# Patient Record
Sex: FEMALE | Race: WHITE | Employment: OTHER | URBAN - METROPOLITAN AREA
[De-identification: names, ages, dates, MRNs, and addresses within clinical notes are randomized per-mention and may not be internally consistent; named-entity substitution may affect disease eponyms.]

---

## 2017-06-01 ENCOUNTER — AMBULATORY - HEALTHEAST (OUTPATIENT)
Dept: CARDIOLOGY | Facility: CLINIC | Age: 82
End: 2017-06-01

## 2017-06-06 ENCOUNTER — OFFICE VISIT - HEALTHEAST (OUTPATIENT)
Dept: CARDIOLOGY | Facility: CLINIC | Age: 82
End: 2017-06-06

## 2017-06-06 ENCOUNTER — AMBULATORY - HEALTHEAST (OUTPATIENT)
Dept: CARDIOLOGY | Facility: CLINIC | Age: 82
End: 2017-06-06

## 2017-06-06 DIAGNOSIS — R06.09 DOE (DYSPNEA ON EXERTION): ICD-10-CM

## 2017-06-06 DIAGNOSIS — Z95.0 CARDIAC PACEMAKER IN SITU: ICD-10-CM

## 2017-06-06 LAB — HCC DEVICE COMMENTS: NORMAL

## 2017-06-06 RX ORDER — MAGNESIUM OXIDE 400 MG/1
400 TABLET ORAL DAILY
Status: SHIPPED | COMMUNITY
Start: 2017-06-06

## 2017-06-06 RX ORDER — LEVOTHYROXINE SODIUM 150 UG/1
150 TABLET ORAL DAILY
Status: SHIPPED | COMMUNITY
Start: 2017-06-06

## 2017-06-06 ASSESSMENT — MIFFLIN-ST. JEOR: SCORE: 1110.91

## 2017-06-24 ENCOUNTER — COMMUNICATION - HEALTHEAST (OUTPATIENT)
Dept: CARDIOLOGY | Facility: CLINIC | Age: 82
End: 2017-06-24

## 2017-06-24 DIAGNOSIS — R06.09 DOE (DYSPNEA ON EXERTION): ICD-10-CM

## 2017-07-19 ENCOUNTER — COMMUNICATION - HEALTHEAST (OUTPATIENT)
Dept: CARDIOLOGY | Facility: CLINIC | Age: 82
End: 2017-07-19

## 2017-07-19 DIAGNOSIS — R06.09 DOE (DYSPNEA ON EXERTION): ICD-10-CM

## 2017-08-15 ENCOUNTER — AMBULATORY - HEALTHEAST (OUTPATIENT)
Dept: CARDIOLOGY | Facility: CLINIC | Age: 82
End: 2017-08-15

## 2017-08-15 DIAGNOSIS — I35.9 AORTIC VALVE DISORDER: ICD-10-CM

## 2017-08-15 DIAGNOSIS — Z95.2 S/P TAVR (TRANSCATHETER AORTIC VALVE REPLACEMENT): ICD-10-CM

## 2017-09-14 ENCOUNTER — AMBULATORY - HEALTHEAST (OUTPATIENT)
Dept: CARDIOLOGY | Facility: CLINIC | Age: 82
End: 2017-09-14

## 2017-09-14 DIAGNOSIS — Z95.0 CARDIAC PACEMAKER IN SITU: ICD-10-CM

## 2017-09-14 LAB — HCC DEVICE COMMENTS: NORMAL

## 2017-11-07 ENCOUNTER — HOSPITAL ENCOUNTER (OUTPATIENT)
Dept: CARDIOLOGY | Facility: CLINIC | Age: 82
Discharge: HOME | End: 2017-11-07
Attending: INTERNAL MEDICINE

## 2017-11-07 DIAGNOSIS — I35.9 AORTIC VALVE DISORDER: ICD-10-CM

## 2017-11-07 DIAGNOSIS — Z95.2 S/P TAVR (TRANSCATHETER AORTIC VALVE REPLACEMENT): ICD-10-CM

## 2017-11-07 LAB
AORTIC ROOT: 2.5 CM
AORTIC ROOT: 2.5 CM
AORTIC VALVE MEAN VELOCITY: 113 CM/S
AORTIC VALVE MEAN VELOCITY: 113 CM/S
AV MEAN GRADIENT: 6 MMHG
AV MEAN GRADIENT: 6 MMHG
BSA FOR ECHO PROCEDURE: 1.78 M2
CV BLOOD PRESSURE: NORMAL MMHG
CV ECHO HEIGHT: 64.8 IN
CV ECHO WEIGHT: 153 LBS
DOP CALC AO PEAK VEL: 181 CM/S
DOP CALC AO PEAK VEL: 181 CM/S
DOP CALC AO VTI: 40.8 CM
DOP CALC AO VTI: 40.8 CM
DOP CALC LVOT AREA: 2.27 CM2
DOP CALC LVOT DIAMETER: 1.7 CM
DOP CALC LVOT PEAK VEL: 96.3 CM/S
DOP CALC LVOT STROKE VOLUME: 45.8 CM3
DOP CALC MV VTI: 41.8 CM
DOP CALC MV VTI: 42.2 CM
DOP CALC MV VTI: 42.8 CM
DOP CALC MV VTI: 44.5 CM
DOP CALCLVOT PEAK VEL VTI: 20.2 CM
EJECTION FRACTION: 64 % (ref 55–75)
FRACTIONAL SHORTENING: 53.6 % (ref 28–44)
INTERVENTRICULAR SEPTUM IN END DIASTOLE: 1.32 CM (ref 0.6–0.9)
IVS/PW RATIO: 1.1
LA AREA 1: 31.6 CM2
LA AREA 2: 30 CM2
LEFT ATRIUM LENGTH: 6.39 CM
LEFT ATRIUM LENGTH: 6.8 CM
LEFT ATRIUM SIZE: 5.3 CM
LEFT ATRIUM SIZE: 5.3 CM
LEFT VENTRICLE CARDIAC INDEX: 1.6 L/MIN/M2
LEFT VENTRICLE CARDIAC OUTPUT: 2.9 L/MIN
LEFT VENTRICLE DIASTOLIC VOLUME INDEX: 25.3 CM3/M2 (ref 34–74)
LEFT VENTRICLE DIASTOLIC VOLUME: 45 CM3 (ref 46–106)
LEFT VENTRICLE HEART RATE: 64 BPM
LEFT VENTRICLE MASS INDEX: 131.1 G/M2
LEFT VENTRICLE SYSTOLIC VOLUME INDEX: 9 CM3/M2 (ref 11–31)
LEFT VENTRICLE SYSTOLIC VOLUME: 16 CM3 (ref 14–42)
LEFT VENTRICULAR INTERNAL DIMENSION IN DIASTOLE: 4.78 CM (ref 3.8–5.2)
LEFT VENTRICULAR INTERNAL DIMENSION IN SYSTOLE: 2.22 CM (ref 2.2–3.5)
LEFT VENTRICULAR MASS: 233.4 G
LEFT VENTRICULAR OUTFLOW TRACT MEAN GRADIENT: 2 MMHG
LEFT VENTRICULAR OUTFLOW TRACT MEAN VELOCITY: 61.9 CM/S
LEFT VENTRICULAR OUTFLOW TRACT PEAK GRADIENT: 4 MMHG
LEFT VENTRICULAR POSTERIOR WALL IN END DIASTOLE: 1.2 CM (ref 0.6–0.9)
LV STROKE VOLUME INDEX: 25.7 ML/M2
MITRAL VALVE DECELERATION SLOPE: 5910 MM/S2
MITRAL VALVE DECELERATION SLOPE: 5910 MM/S2
MITRAL VALVE MEAN INFLOW VELOCITY: 73.6 CM/S
MITRAL VALVE MEAN INFLOW VELOCITY: 73.8 CM/S
MITRAL VALVE MEAN INFLOW VELOCITY: 74.3 CM/S
MITRAL VALVE MEAN INFLOW VELOCITY: 75.4 CM/S
MITRAL VALVE PEAK VELOCITY: 150 CM/S
MITRAL VALVE PEAK VELOCITY: 157 CM/S
MITRAL VALVE PEAK VELOCITY: 157 CM/S
MITRAL VALVE PEAK VELOCITY: 164 CM/S
MITRAL VALVE PRESSURE HALF-TIME: 78 MS
MV DECELERATION TIME: 289 MS
MV DECELERATION TIME: 289 MS
MV E'TISSUE VEL-LAT: 9.26 CM/S
MV E'TISSUE VEL-LAT: 9.26 CM/S
MV E'TISSUE VEL-MED: 5.95 CM/S
MV E'TISSUE VEL-MED: 5.95 CM/S
MV MEAN GRADIENT: 3 MMHG
MV VALVE AREA PRESSURE 1/2 METHOD: 2.8 CM2
NUC REST DIASTOLIC VOLUME INDEX: 2448 LBS
NUC REST SYSTOLIC VOLUME INDEX: 64.75 IN
PR MAX PG: 4 MMHG
PR PEAK VELOCITY: 102 CM/S
PR PEAK VELOCITY: 102 CM/S
TRICUSPID VALVE ANULAR PLANE SYSTOLIC EXCURSION: 1.7 CM
TRICUSPID VALVE ANULAR PLANE SYSTOLIC EXCURSION: 1.7 CM
TRICUSPID VALVE PEAK REGURGITANT VELOCITY: 221 CM/S
TRICUSPID VALVE PEAK REGURGITANT VELOCITY: 248 CM/S
TRICUSPID VALVE PEAK REGURGITANT VELOCITY: 250 CM/S
TRICUSPID VALVE PEAK REGURGITANT VELOCITY: 262 CM/S
TRICUSPID VALVE PEAK REGURGITANT VELOCITY: 270 CM/S

## 2017-11-07 ASSESSMENT — MIFFLIN-ST. JEOR: SCORE: 1110.91

## 2017-11-10 ENCOUNTER — OFFICE VISIT - HEALTHEAST (OUTPATIENT)
Dept: CARDIOLOGY | Facility: CLINIC | Age: 82
End: 2017-11-10

## 2017-11-10 DIAGNOSIS — I34.0 NON-RHEUMATIC MITRAL REGURGITATION: ICD-10-CM

## 2017-11-10 DIAGNOSIS — I25.10 CORONARY ARTERY DISEASE INVOLVING NATIVE CORONARY ARTERY OF NATIVE HEART WITHOUT ANGINA PECTORIS: ICD-10-CM

## 2017-11-10 DIAGNOSIS — I48.21 PERMANENT ATRIAL FIBRILLATION (H): ICD-10-CM

## 2017-11-10 DIAGNOSIS — R06.09 DOE (DYSPNEA ON EXERTION): ICD-10-CM

## 2017-11-10 DIAGNOSIS — I50.32 CHRONIC DIASTOLIC HEART FAILURE (H): ICD-10-CM

## 2017-11-10 DIAGNOSIS — Z95.2 S/P TAVR (TRANSCATHETER AORTIC VALVE REPLACEMENT): ICD-10-CM

## 2017-11-10 ASSESSMENT — MIFFLIN-ST. JEOR: SCORE: 1103.54

## 2017-12-11 ENCOUNTER — COMMUNICATION - HEALTHEAST (OUTPATIENT)
Dept: ADMINISTRATIVE | Facility: CLINIC | Age: 82
End: 2017-12-11

## 2017-12-14 ENCOUNTER — AMBULATORY - HEALTHEAST (OUTPATIENT)
Dept: CARDIOLOGY | Facility: CLINIC | Age: 82
End: 2017-12-14

## 2017-12-14 DIAGNOSIS — Z95.0 CARDIAC PACEMAKER IN SITU: ICD-10-CM

## 2017-12-14 LAB — HCC DEVICE COMMENTS: NORMAL

## 2017-12-14 ASSESSMENT — MIFFLIN-ST. JEOR: SCORE: 1126.22

## 2018-03-19 ENCOUNTER — AMBULATORY - HEALTHEAST (OUTPATIENT)
Dept: CARDIOLOGY | Facility: CLINIC | Age: 83
End: 2018-03-19

## 2018-03-19 DIAGNOSIS — Z95.0 CARDIAC PACEMAKER IN SITU: ICD-10-CM

## 2018-03-20 LAB — HCC DEVICE COMMENTS: NORMAL

## 2018-04-17 ENCOUNTER — RECORDS - HEALTHEAST (OUTPATIENT)
Dept: LAB | Facility: CLINIC | Age: 83
End: 2018-04-17

## 2018-04-17 LAB
ALBUMIN SERPL-MCNC: 3.7 G/DL (ref 3.5–5)
ALP SERPL-CCNC: 75 U/L (ref 45–120)
ALT SERPL W P-5'-P-CCNC: 16 U/L (ref 0–45)
ANION GAP SERPL CALCULATED.3IONS-SCNC: 7 MMOL/L (ref 5–18)
AST SERPL W P-5'-P-CCNC: 22 U/L (ref 0–40)
BILIRUB SERPL-MCNC: 2.3 MG/DL (ref 0–1)
BUN SERPL-MCNC: 23 MG/DL (ref 8–28)
CALCIUM SERPL-MCNC: 9.5 MG/DL (ref 8.5–10.5)
CHLORIDE BLD-SCNC: 102 MMOL/L (ref 98–107)
CHOLEST SERPL-MCNC: 130 MG/DL
CO2 SERPL-SCNC: 34 MMOL/L (ref 22–31)
CREAT SERPL-MCNC: 1.38 MG/DL (ref 0.6–1.1)
FASTING STATUS PATIENT QL REPORTED: NO
GFR SERPL CREATININE-BSD FRML MDRD: 36 ML/MIN/1.73M2
GLUCOSE BLD-MCNC: 108 MG/DL (ref 70–125)
HDLC SERPL-MCNC: 40 MG/DL
LDLC SERPL CALC-MCNC: 55 MG/DL
MAGNESIUM SERPL-MCNC: 2.4 MG/DL (ref 1.8–2.6)
POTASSIUM BLD-SCNC: 4.1 MMOL/L (ref 3.5–5)
PROT SERPL-MCNC: 7.5 G/DL (ref 6–8)
SODIUM SERPL-SCNC: 143 MMOL/L (ref 136–145)
TRIGL SERPL-MCNC: 175 MG/DL
TSH SERPL DL<=0.005 MIU/L-ACNC: 0.78 UIU/ML (ref 0.3–5)

## 2018-06-25 ENCOUNTER — AMBULATORY - HEALTHEAST (OUTPATIENT)
Dept: CARDIOLOGY | Facility: CLINIC | Age: 83
End: 2018-06-25

## 2018-06-25 DIAGNOSIS — Z95.0 CARDIAC PACEMAKER IN SITU: ICD-10-CM

## 2018-06-25 LAB
HCC DEVICE COMMENTS: NORMAL
HCC DEVICE IMPLANTING PROVIDER: NORMAL
HCC DEVICE MANUFACTURE: NORMAL
HCC DEVICE MODEL: NORMAL
HCC DEVICE SERIAL NUMBER: NORMAL
HCC DEVICE TYPE: NORMAL

## 2018-10-01 ENCOUNTER — AMBULATORY - HEALTHEAST (OUTPATIENT)
Dept: CARDIOLOGY | Facility: CLINIC | Age: 83
End: 2018-10-01

## 2018-10-01 DIAGNOSIS — Z95.0 CARDIAC PACEMAKER IN SITU: ICD-10-CM

## 2018-12-11 ENCOUNTER — RECORDS - HEALTHEAST (OUTPATIENT)
Dept: ADMINISTRATIVE | Facility: OTHER | Age: 83
End: 2018-12-11

## 2018-12-11 ENCOUNTER — AMBULATORY - HEALTHEAST (OUTPATIENT)
Dept: CARDIOLOGY | Facility: CLINIC | Age: 83
End: 2018-12-11

## 2018-12-12 ENCOUNTER — AMBULATORY - HEALTHEAST (OUTPATIENT)
Dept: CARDIOLOGY | Facility: CLINIC | Age: 83
End: 2018-12-12

## 2018-12-12 ENCOUNTER — COMMUNICATION - HEALTHEAST (OUTPATIENT)
Dept: CARDIOLOGY | Facility: CLINIC | Age: 83
End: 2018-12-12

## 2018-12-12 ENCOUNTER — OFFICE VISIT - HEALTHEAST (OUTPATIENT)
Dept: CARDIOLOGY | Facility: CLINIC | Age: 83
End: 2018-12-12

## 2018-12-12 DIAGNOSIS — I25.10 CORONARY ARTERY DISEASE INVOLVING NATIVE CORONARY ARTERY OF NATIVE HEART WITHOUT ANGINA PECTORIS: ICD-10-CM

## 2018-12-12 DIAGNOSIS — I48.21 PERMANENT ATRIAL FIBRILLATION (H): ICD-10-CM

## 2018-12-12 DIAGNOSIS — Z95.0 CARDIAC PACEMAKER IN SITU: ICD-10-CM

## 2018-12-12 DIAGNOSIS — Z95.2 S/P TAVR (TRANSCATHETER AORTIC VALVE REPLACEMENT): ICD-10-CM

## 2018-12-12 ASSESSMENT — MIFFLIN-ST. JEOR: SCORE: 1085.4

## 2018-12-19 ENCOUNTER — COMMUNICATION - HEALTHEAST (OUTPATIENT)
Dept: CARDIOLOGY | Facility: CLINIC | Age: 83
End: 2018-12-19

## 2018-12-19 DIAGNOSIS — I34.0 NON-RHEUMATIC MITRAL REGURGITATION: ICD-10-CM

## 2018-12-21 ENCOUNTER — COMMUNICATION - HEALTHEAST (OUTPATIENT)
Dept: CARDIOLOGY | Facility: CLINIC | Age: 83
End: 2018-12-21

## 2019-03-14 ENCOUNTER — AMBULATORY - HEALTHEAST (OUTPATIENT)
Dept: CARDIOLOGY | Facility: CLINIC | Age: 84
End: 2019-03-14

## 2019-03-14 DIAGNOSIS — Z95.0 CARDIAC PACEMAKER IN SITU: ICD-10-CM

## 2019-05-01 ENCOUNTER — RECORDS - HEALTHEAST (OUTPATIENT)
Dept: LAB | Facility: CLINIC | Age: 84
End: 2019-05-01

## 2019-05-01 LAB
ALBUMIN SERPL-MCNC: 3.9 G/DL (ref 3.5–5)
ALP SERPL-CCNC: 71 U/L (ref 45–120)
ALT SERPL W P-5'-P-CCNC: 16 U/L (ref 0–45)
ANION GAP SERPL CALCULATED.3IONS-SCNC: 12 MMOL/L (ref 5–18)
AST SERPL W P-5'-P-CCNC: 20 U/L (ref 0–40)
BILIRUB SERPL-MCNC: 2 MG/DL (ref 0–1)
BUN SERPL-MCNC: 26 MG/DL (ref 8–28)
CALCIUM SERPL-MCNC: 10.1 MG/DL (ref 8.5–10.5)
CHLORIDE BLD-SCNC: 103 MMOL/L (ref 98–107)
CHOLEST SERPL-MCNC: 121 MG/DL
CO2 SERPL-SCNC: 29 MMOL/L (ref 22–31)
CREAT SERPL-MCNC: 1.23 MG/DL (ref 0.6–1.1)
FASTING STATUS PATIENT QL REPORTED: NO
GFR SERPL CREATININE-BSD FRML MDRD: 41 ML/MIN/1.73M2
GLUCOSE BLD-MCNC: 88 MG/DL (ref 70–125)
HDLC SERPL-MCNC: 38 MG/DL
LDLC SERPL CALC-MCNC: 49 MG/DL
POTASSIUM BLD-SCNC: 4.5 MMOL/L (ref 3.5–5)
PROT SERPL-MCNC: 7.4 G/DL (ref 6–8)
SODIUM SERPL-SCNC: 144 MMOL/L (ref 136–145)
TRIGL SERPL-MCNC: 171 MG/DL
TSH SERPL DL<=0.005 MIU/L-ACNC: 0.04 UIU/ML (ref 0.3–5)

## 2019-06-17 ENCOUNTER — AMBULATORY - HEALTHEAST (OUTPATIENT)
Dept: CARDIOLOGY | Facility: CLINIC | Age: 84
End: 2019-06-17

## 2019-06-17 DIAGNOSIS — Z95.0 CARDIAC PACEMAKER IN SITU: ICD-10-CM

## 2019-09-18 ENCOUNTER — AMBULATORY - HEALTHEAST (OUTPATIENT)
Dept: CARDIOLOGY | Facility: CLINIC | Age: 84
End: 2019-09-18

## 2019-09-18 DIAGNOSIS — Z95.0 CARDIAC PACEMAKER IN SITU: ICD-10-CM

## 2019-11-27 ENCOUNTER — COMMUNICATION - HEALTHEAST (OUTPATIENT)
Dept: CARDIOLOGY | Facility: CLINIC | Age: 84
End: 2019-11-27

## 2019-11-27 DIAGNOSIS — I34.0 NON-RHEUMATIC MITRAL REGURGITATION: ICD-10-CM

## 2019-12-04 ENCOUNTER — RECORDS - HEALTHEAST (OUTPATIENT)
Dept: ADMINISTRATIVE | Facility: OTHER | Age: 84
End: 2019-12-04

## 2019-12-04 ENCOUNTER — AMBULATORY - HEALTHEAST (OUTPATIENT)
Dept: CARDIOLOGY | Facility: CLINIC | Age: 84
End: 2019-12-04

## 2019-12-11 ENCOUNTER — OFFICE VISIT - HEALTHEAST (OUTPATIENT)
Dept: CARDIOLOGY | Facility: CLINIC | Age: 84
End: 2019-12-11

## 2019-12-11 ENCOUNTER — AMBULATORY - HEALTHEAST (OUTPATIENT)
Dept: CARDIOLOGY | Facility: CLINIC | Age: 84
End: 2019-12-11

## 2019-12-11 DIAGNOSIS — I50.32 CHRONIC DIASTOLIC HEART FAILURE (H): ICD-10-CM

## 2019-12-11 DIAGNOSIS — I48.21 PERMANENT ATRIAL FIBRILLATION (H): ICD-10-CM

## 2019-12-11 DIAGNOSIS — I25.10 CORONARY ARTERY DISEASE INVOLVING NATIVE CORONARY ARTERY OF NATIVE HEART WITHOUT ANGINA PECTORIS: ICD-10-CM

## 2019-12-11 DIAGNOSIS — Z95.0 CARDIAC PACEMAKER IN SITU: ICD-10-CM

## 2019-12-11 DIAGNOSIS — I49.5 SICK SINUS SYNDROME (H): ICD-10-CM

## 2019-12-11 DIAGNOSIS — Z95.2 S/P TAVR (TRANSCATHETER AORTIC VALVE REPLACEMENT): ICD-10-CM

## 2019-12-11 DIAGNOSIS — I47.29 NSVT (NONSUSTAINED VENTRICULAR TACHYCARDIA) (H): ICD-10-CM

## 2019-12-11 DIAGNOSIS — I34.0 NON-RHEUMATIC MITRAL REGURGITATION: ICD-10-CM

## 2019-12-11 DIAGNOSIS — I25.10 CORONARY ARTERY DISEASE INVOLVING NATIVE CORONARY ARTERY OF NATIVE HEART, ANGINA PRESENCE UNSPECIFIED: ICD-10-CM

## 2019-12-11 RX ORDER — SIMVASTATIN 40 MG
40 TABLET ORAL AT BEDTIME
Qty: 90 TABLET | Refills: 3 | Status: SHIPPED | OUTPATIENT
Start: 2019-12-11

## 2019-12-11 RX ORDER — LOSARTAN POTASSIUM 100 MG/1
TABLET ORAL
Qty: 90 TABLET | Refills: 3 | Status: SHIPPED | OUTPATIENT
Start: 2019-12-11

## 2019-12-11 RX ORDER — WARFARIN SODIUM 2 MG/1
2 TABLET ORAL DAILY
Qty: 90 TABLET | Refills: 3 | Status: SHIPPED | OUTPATIENT
Start: 2019-12-11

## 2019-12-11 RX ORDER — METOPROLOL SUCCINATE 50 MG/1
50 TABLET, EXTENDED RELEASE ORAL DAILY
Qty: 90 TABLET | Refills: 3 | Status: SHIPPED | OUTPATIENT
Start: 2019-12-11

## 2019-12-11 RX ORDER — FUROSEMIDE 40 MG
60 TABLET ORAL DAILY
Qty: 135 TABLET | Refills: 3 | Status: SHIPPED | OUTPATIENT
Start: 2019-12-11

## 2019-12-11 ASSESSMENT — MIFFLIN-ST. JEOR: SCORE: 1094.47

## 2019-12-30 ENCOUNTER — HOSPITAL ENCOUNTER (OUTPATIENT)
Dept: CARDIOLOGY | Facility: CLINIC | Age: 84
Discharge: HOME | End: 2019-12-30
Attending: INTERNAL MEDICINE

## 2019-12-30 DIAGNOSIS — Z95.2 S/P TAVR (TRANSCATHETER AORTIC VALVE REPLACEMENT): ICD-10-CM

## 2019-12-30 ASSESSMENT — MIFFLIN-ST. JEOR: SCORE: 1094.47

## 2019-12-31 LAB
AORTIC VALVE MEAN VELOCITY: 126 CM/S
AV DIMENSIONLESS INDEX VTI: 0.5
AV MEAN GRADIENT: 7 MMHG
AV PEAK GRADIENT: 11.3 MMHG
AV VALVE AREA: 1 CM2
AV VELOCITY RATIO: 0.5
BSA FOR ECHO PROCEDURE: 1.76 M2
CV BLOOD PRESSURE: ABNORMAL MMHG
CV ECHO HEIGHT: 64 IN
CV ECHO WEIGHT: 152 LBS
DOP CALC AO PEAK VEL: 168 CM/S
DOP CALC AO VTI: 39.2 CM
DOP CALC LVOT AREA: 2.01 CM2
DOP CALC LVOT DIAMETER: 1.6 CM
DOP CALC LVOT PEAK VEL: 78 CM/S
DOP CALC LVOT STROKE VOLUME: 39.6 CM3
DOP CALC MV VTI: 36.6 CM
DOP CALCLVOT PEAK VEL VTI: 19.7 CM
EJECTION FRACTION: 70 % (ref 55–75)
FRACTIONAL SHORTENING: 32.7 % (ref 28–44)
INTERVENTRICULAR SEPTUM IN END DIASTOLE: 0.85 CM (ref 0.6–0.9)
IVS/PW RATIO: 0.9
LA AREA 1: 33.3 CM2
LEFT ATRIUM LENGTH: 8.03 CM
LEFT VENTRICLE CARDIAC INDEX: 1.3 L/MIN/M2
LEFT VENTRICLE CARDIAC OUTPUT: 2.4 L/MIN
LEFT VENTRICLE DIASTOLIC VOLUME INDEX: 24.4 CM3/M2 (ref 29–61)
LEFT VENTRICLE DIASTOLIC VOLUME: 43 CM3 (ref 46–106)
LEFT VENTRICLE HEART RATE: 60 BPM
LEFT VENTRICLE MASS INDEX: 109.9 G/M2
LEFT VENTRICLE SYSTOLIC VOLUME INDEX: 7.4 CM3/M2 (ref 8–24)
LEFT VENTRICLE SYSTOLIC VOLUME: 13 CM3 (ref 14–42)
LEFT VENTRICULAR INTERNAL DIMENSION IN DIASTOLE: 5.69 CM (ref 3.8–5.2)
LEFT VENTRICULAR INTERNAL DIMENSION IN SYSTOLE: 3.83 CM (ref 2.2–3.5)
LEFT VENTRICULAR MASS: 193.4 G
LEFT VENTRICULAR OUTFLOW TRACT MEAN GRADIENT: 2 MMHG
LEFT VENTRICULAR OUTFLOW TRACT MEAN VELOCITY: 58.8 CM/S
LEFT VENTRICULAR OUTFLOW TRACT PEAK GRADIENT: 2 MMHG
LEFT VENTRICULAR POSTERIOR WALL IN END DIASTOLE: 0.93 CM (ref 0.6–0.9)
LV STROKE VOLUME INDEX: 22.5 ML/M2
MITRAL VALVE MEAN INFLOW VELOCITY: 78.5 CM/S
MITRAL VALVE PEAK VELOCITY: 150 CM/S
MV AREA VTI: 1.08 CM2
MV DECELERATION TIME: 282 MS
MV MEAN GRADIENT: 3 MMHG
MV PEAK E VELOCITY: 131 CM/S
MV PEAK GRADIENT: 9 MMHG
MV VALVE AREA BY CONTINUITY EQUATION: 1.1 CM2
NUC REST DIASTOLIC VOLUME INDEX: 2432 LBS
NUC REST SYSTOLIC VOLUME INDEX: 64 IN
TRICUSPID REGURGITATION PEAK PRESSURE GRADIENT: 40.4 MMHG
TRICUSPID VALVE ANULAR PLANE SYSTOLIC EXCURSION: 1.2 CM
TRICUSPID VALVE PEAK REGURGITANT VELOCITY: 318 CM/S

## 2020-03-11 ENCOUNTER — AMBULATORY - HEALTHEAST (OUTPATIENT)
Dept: CARDIOLOGY | Facility: CLINIC | Age: 85
End: 2020-03-11

## 2020-03-11 DIAGNOSIS — I48.21 PERMANENT ATRIAL FIBRILLATION (H): ICD-10-CM

## 2020-03-11 DIAGNOSIS — Z95.0 CARDIAC PACEMAKER IN SITU: ICD-10-CM

## 2020-06-02 ENCOUNTER — RECORDS - HEALTHEAST (OUTPATIENT)
Dept: LAB | Facility: CLINIC | Age: 85
End: 2020-06-02

## 2020-06-02 LAB
ANION GAP SERPL CALCULATED.3IONS-SCNC: 11 MMOL/L (ref 5–18)
BUN SERPL-MCNC: 15 MG/DL (ref 8–28)
CALCIUM SERPL-MCNC: 9.9 MG/DL (ref 8.5–10.5)
CHLORIDE BLD-SCNC: 100 MMOL/L (ref 98–107)
CHOLEST SERPL-MCNC: 137 MG/DL
CO2 SERPL-SCNC: 29 MMOL/L (ref 22–31)
CREAT SERPL-MCNC: 1.23 MG/DL (ref 0.6–1.1)
FASTING STATUS PATIENT QL REPORTED: YES
GFR SERPL CREATININE-BSD FRML MDRD: 41 ML/MIN/1.73M2
GLUCOSE BLD-MCNC: 90 MG/DL (ref 70–125)
HDLC SERPL-MCNC: 44 MG/DL
LDLC SERPL CALC-MCNC: 57 MG/DL
MAGNESIUM SERPL-MCNC: 2.1 MG/DL (ref 1.8–2.6)
POTASSIUM BLD-SCNC: 4.3 MMOL/L (ref 3.5–5)
SODIUM SERPL-SCNC: 140 MMOL/L (ref 136–145)
T4 FREE SERPL-MCNC: 1.4 NG/DL (ref 0.7–1.8)
TRIGL SERPL-MCNC: 179 MG/DL
TSH SERPL DL<=0.005 MIU/L-ACNC: 0.07 UIU/ML (ref 0.3–5)

## 2020-06-11 ENCOUNTER — AMBULATORY - HEALTHEAST (OUTPATIENT)
Dept: CARDIOLOGY | Facility: CLINIC | Age: 85
End: 2020-06-11

## 2020-06-11 ENCOUNTER — COMMUNICATION - HEALTHEAST (OUTPATIENT)
Dept: CARDIOLOGY | Facility: CLINIC | Age: 85
End: 2020-06-11

## 2020-06-11 DIAGNOSIS — Z95.0 CARDIAC PACEMAKER IN SITU: ICD-10-CM

## 2020-06-11 DIAGNOSIS — R00.1 BRADYCARDIA: ICD-10-CM

## 2020-06-15 ENCOUNTER — COMMUNICATION - HEALTHEAST (OUTPATIENT)
Dept: CARDIOLOGY | Facility: CLINIC | Age: 85
End: 2020-06-15

## 2020-09-10 ENCOUNTER — AMBULATORY - HEALTHEAST (OUTPATIENT)
Dept: CARDIOLOGY | Facility: CLINIC | Age: 85
End: 2020-09-10

## 2020-09-10 DIAGNOSIS — R00.1 BRADYCARDIA: ICD-10-CM

## 2020-09-10 DIAGNOSIS — Z95.0 CARDIAC PACEMAKER IN SITU: ICD-10-CM

## 2021-02-08 ENCOUNTER — COMMUNICATION - HEALTHEAST (OUTPATIENT)
Dept: CARDIOLOGY | Facility: CLINIC | Age: 86
End: 2021-02-08

## 2021-03-03 ENCOUNTER — AMBULATORY - HEALTHEAST (OUTPATIENT)
Dept: CARDIOLOGY | Facility: CLINIC | Age: 86
End: 2021-03-03

## 2021-03-03 DIAGNOSIS — Z95.0 CARDIAC PACEMAKER IN SITU: ICD-10-CM

## 2021-03-03 DIAGNOSIS — R00.1 BRADYCARDIA: ICD-10-CM

## 2021-03-18 ENCOUNTER — RECORDS - HEALTHEAST (OUTPATIENT)
Dept: ADMINISTRATIVE | Facility: OTHER | Age: 86
End: 2021-03-18

## 2021-03-18 ENCOUNTER — AMBULATORY - HEALTHEAST (OUTPATIENT)
Dept: CARDIOLOGY | Facility: CLINIC | Age: 86
End: 2021-03-18

## 2021-03-22 ENCOUNTER — OFFICE VISIT - HEALTHEAST (OUTPATIENT)
Dept: CARDIOLOGY | Facility: CLINIC | Age: 86
End: 2021-03-22

## 2021-03-22 ENCOUNTER — AMBULATORY - HEALTHEAST (OUTPATIENT)
Dept: CARDIOLOGY | Facility: CLINIC | Age: 86
End: 2021-03-22

## 2021-03-22 DIAGNOSIS — Z95.2 S/P TAVR (TRANSCATHETER AORTIC VALVE REPLACEMENT): ICD-10-CM

## 2021-03-22 DIAGNOSIS — I48.21 PERMANENT ATRIAL FIBRILLATION (H): ICD-10-CM

## 2021-03-22 DIAGNOSIS — I49.5 SICK SINUS SYNDROME (H): ICD-10-CM

## 2021-03-22 DIAGNOSIS — Z95.0 CARDIAC PACEMAKER IN SITU: ICD-10-CM

## 2021-03-22 DIAGNOSIS — R00.1 BRADYCARDIA: ICD-10-CM

## 2021-03-22 RX ORDER — AMOXICILLIN 500 MG/1
CAPSULE ORAL
Status: SHIPPED | COMMUNITY
Start: 2020-06-11

## 2021-03-22 RX ORDER — LEVOTHYROXINE SODIUM 125 MCG
112 TABLET ORAL DAILY
Status: SHIPPED | COMMUNITY
Start: 2020-12-16

## 2021-03-22 ASSESSMENT — MIFFLIN-ST. JEOR: SCORE: 1089.93

## 2021-03-25 ENCOUNTER — RECORDS - HEALTHEAST (OUTPATIENT)
Dept: LAB | Facility: CLINIC | Age: 86
End: 2021-03-25

## 2021-03-25 LAB
ALBUMIN SERPL-MCNC: 4 G/DL (ref 3.5–5)
ALP SERPL-CCNC: 69 U/L (ref 45–120)
ALT SERPL W P-5'-P-CCNC: 17 U/L (ref 0–45)
ANION GAP SERPL CALCULATED.3IONS-SCNC: 10 MMOL/L (ref 5–18)
AST SERPL W P-5'-P-CCNC: 26 U/L (ref 0–40)
BILIRUB SERPL-MCNC: 1.9 MG/DL (ref 0–1)
BUN SERPL-MCNC: 19 MG/DL (ref 8–28)
CALCIUM SERPL-MCNC: 9.6 MG/DL (ref 8.5–10.5)
CHLORIDE BLD-SCNC: 101 MMOL/L (ref 98–107)
CHOLEST SERPL-MCNC: 128 MG/DL
CO2 SERPL-SCNC: 31 MMOL/L (ref 22–31)
CREAT SERPL-MCNC: 1.26 MG/DL (ref 0.6–1.1)
FASTING STATUS PATIENT QL REPORTED: ABNORMAL
GFR SERPL CREATININE-BSD FRML MDRD: 40 ML/MIN/1.73M2
GLUCOSE BLD-MCNC: 87 MG/DL (ref 70–125)
HDLC SERPL-MCNC: 44 MG/DL
LDLC SERPL CALC-MCNC: 50 MG/DL
MAGNESIUM SERPL-MCNC: 2.3 MG/DL (ref 1.8–2.6)
POTASSIUM BLD-SCNC: 4.9 MMOL/L (ref 3.5–5)
PROT SERPL-MCNC: 7.4 G/DL (ref 6–8)
SODIUM SERPL-SCNC: 142 MMOL/L (ref 136–145)
T4 FREE SERPL-MCNC: 1.5 NG/DL (ref 0.7–1.8)
TRIGL SERPL-MCNC: 169 MG/DL
TSH SERPL DL<=0.005 MIU/L-ACNC: 0.04 UIU/ML (ref 0.3–5)

## 2021-04-01 ENCOUNTER — AMBULATORY - HEALTHEAST (OUTPATIENT)
Dept: ADMINISTRATIVE | Facility: REHABILITATION | Age: 86
End: 2021-04-01

## 2021-04-01 DIAGNOSIS — M79.2 PERIPHERAL NEURALGIA: ICD-10-CM

## 2021-04-08 ENCOUNTER — OFFICE VISIT - HEALTHEAST (OUTPATIENT)
Dept: PHYSICAL THERAPY | Facility: REHABILITATION | Age: 86
End: 2021-04-08

## 2021-04-08 DIAGNOSIS — M79.2 PERIPHERAL NEURALGIA: ICD-10-CM

## 2021-04-19 ENCOUNTER — OFFICE VISIT - HEALTHEAST (OUTPATIENT)
Dept: PHYSICAL THERAPY | Facility: REHABILITATION | Age: 86
End: 2021-04-19

## 2021-04-19 DIAGNOSIS — M79.2 PERIPHERAL NEURALGIA: ICD-10-CM

## 2021-04-26 ENCOUNTER — OFFICE VISIT - HEALTHEAST (OUTPATIENT)
Dept: PHYSICAL THERAPY | Facility: REHABILITATION | Age: 86
End: 2021-04-26

## 2021-04-26 DIAGNOSIS — M79.2 PERIPHERAL NEURALGIA: ICD-10-CM

## 2021-05-03 ENCOUNTER — OFFICE VISIT - HEALTHEAST (OUTPATIENT)
Dept: PHYSICAL THERAPY | Facility: REHABILITATION | Age: 86
End: 2021-05-03

## 2021-05-03 DIAGNOSIS — M79.2 PERIPHERAL NEURALGIA: ICD-10-CM

## 2021-05-27 ENCOUNTER — RECORDS - HEALTHEAST (OUTPATIENT)
Dept: ADMINISTRATIVE | Facility: CLINIC | Age: 86
End: 2021-05-27

## 2021-05-28 ENCOUNTER — RECORDS - HEALTHEAST (OUTPATIENT)
Dept: ADMINISTRATIVE | Facility: CLINIC | Age: 86
End: 2021-05-28

## 2021-05-29 ENCOUNTER — RECORDS - HEALTHEAST (OUTPATIENT)
Dept: ADMINISTRATIVE | Facility: CLINIC | Age: 86
End: 2021-05-29

## 2021-05-30 ENCOUNTER — RECORDS - HEALTHEAST (OUTPATIENT)
Dept: ADMINISTRATIVE | Facility: CLINIC | Age: 86
End: 2021-05-30

## 2021-05-31 VITALS — BODY MASS INDEX: 26.29 KG/M2 | HEIGHT: 64 IN | WEIGHT: 154 LBS

## 2021-05-31 VITALS — BODY MASS INDEX: 25.49 KG/M2 | WEIGHT: 153 LBS | HEIGHT: 65 IN

## 2021-05-31 VITALS — WEIGHT: 153 LBS | BODY MASS INDEX: 25.49 KG/M2 | HEIGHT: 65 IN

## 2021-05-31 VITALS — WEIGHT: 159 LBS | HEIGHT: 64 IN | BODY MASS INDEX: 27.14 KG/M2

## 2021-06-01 ENCOUNTER — RECORDS - HEALTHEAST (OUTPATIENT)
Dept: ADMINISTRATIVE | Facility: CLINIC | Age: 86
End: 2021-06-01

## 2021-06-02 ENCOUNTER — RECORDS - HEALTHEAST (OUTPATIENT)
Dept: ADMINISTRATIVE | Facility: CLINIC | Age: 86
End: 2021-06-02

## 2021-06-02 VITALS — HEIGHT: 64 IN | WEIGHT: 150 LBS | BODY MASS INDEX: 25.61 KG/M2

## 2021-06-04 VITALS
BODY MASS INDEX: 25.95 KG/M2 | HEART RATE: 64 BPM | RESPIRATION RATE: 16 BRPM | DIASTOLIC BLOOD PRESSURE: 70 MMHG | SYSTOLIC BLOOD PRESSURE: 110 MMHG | WEIGHT: 152 LBS | HEIGHT: 64 IN

## 2021-06-04 VITALS — WEIGHT: 152 LBS | BODY MASS INDEX: 25.95 KG/M2 | HEIGHT: 64 IN

## 2021-06-04 NOTE — PROGRESS NOTES
In clinic device check with Device RN and Dr. Sosa.  Please see link for full device report.  Patient was informed of results and next follow up during today's visit.

## 2021-06-05 VITALS
HEIGHT: 64 IN | RESPIRATION RATE: 16 BRPM | WEIGHT: 151 LBS | BODY MASS INDEX: 25.78 KG/M2 | SYSTOLIC BLOOD PRESSURE: 104 MMHG | DIASTOLIC BLOOD PRESSURE: 64 MMHG | HEART RATE: 60 BPM

## 2021-06-08 ENCOUNTER — RECORDS - HEALTHEAST (OUTPATIENT)
Dept: ADMINISTRATIVE | Facility: REHABILITATION | Age: 86
End: 2021-06-08

## 2021-06-08 ENCOUNTER — RECORDS - HEALTHEAST (OUTPATIENT)
Dept: ADMINISTRATIVE | Facility: OTHER | Age: 86
End: 2021-06-08

## 2021-06-08 DIAGNOSIS — G62.9 PERIPHERAL NEUROPATHY: ICD-10-CM

## 2021-06-08 NOTE — TELEPHONE ENCOUNTER
----- Message from Mirian Shahid sent at 6/15/2020  9:43 AM CDT -----  General phone call:    Caller: Patient  Primary cardiologist: Dr. Sosa  Detailed reason for call: Patient has been invited to go to [place] in August.  The altitude is 5300.  She has been experiencing shortness of breath while going up stairs, here  Is it ok for patient to go to [place] due to the altitude will it bother her?    New or active symptoms?   Best phone number: [phone]  Best time to contact: Anytime  Ok to leave a detailed message? no  Device? Yes     Additional Info:

## 2021-06-08 NOTE — TELEPHONE ENCOUNTER
Return call to patient who stated her daughter is planning to drive them to [place] in August to visit her grandson and patient wanted to consult Dr. Sosa - patient has noted increased NICOLE since she was last seen 12-11-19 but she attributes sx to 10# wt gain since she has been quarantined in [facility] facility due to coronavirus pandemic.  Patient admits to very little activity but is attempting to get outside more now - denied any other sx of CP, dizziness, increased fatigue.    Informed patient that question/concerns would be forwarded to Dr. Sosa for recommendations - understanding verbalized.    Please review patient update and address her questions/concerns r.e. traveling to [place] via car.  mg

## 2021-06-08 NOTE — TELEPHONE ENCOUNTER
Type: routine pacemaker check   Presenting Rhythm: , 60bpm  Lead/Battery status: stable  Arrhythmias: 1 VHR. EGM shows NSVT episode lasting 16 beats, rates: 170-180bpm.   Anticoagulant: warfarin  Comments: normal device function. jtr     Transmission reviewed, agree with above. 1 NSVT 16 beats noted on 5/10/20 at ~ 5:30 pm. Last EF 55-60% per Echo 12/30/19. Reviewed with patient, she does not recall any unusual lightheadedness/near-syncope or rapid palpitations. Confirms Toprol XL 50 mg daily.  Advised to call with any symptoms so we can recheck a remote. Verbalized understanding.     Will review NSVT with Dr. Sheila Lara RN

## 2021-06-08 NOTE — TELEPHONE ENCOUNTER
Tell her to begin doing some exercise to see if she can lose weight. Her heart function has been normal so I would be less concerned regarding CHF. She needs to understand that she is at increased risk for coronavirus with traveling so she should wear a mask and take all other precautions.    KAREEN

## 2021-06-08 NOTE — TELEPHONE ENCOUNTER
Recommendations from Dr. Sosa noted, and reviewed with patient. Will continue routine monitoring for now. Patient will call with any troublesome episodes.   Sunita Lara RN

## 2021-06-08 NOTE — TELEPHONE ENCOUNTER
Phone call to patient - informed her of Dr. Sosa's response/recommendations - patient verbalized understanding and offered no further questions/concerns at this time.  mg

## 2021-06-08 NOTE — TELEPHONE ENCOUNTER
Given normal LV function by echo in December and documentation of only mild coronary artery disease and preoperative angiography in 2016, would continue with current medical therapy.    Dr. Sosa

## 2021-06-11 NOTE — PROGRESS NOTES
[facility]'s Partnership Agreement for Device Patients was presented and reviewed with the patient at today's appointment.    In clinic device check with Dr. Sosa.  Please see link for full device report.  Patient was informed of results and next follow up during today's visit.

## 2021-06-15 NOTE — TELEPHONE ENCOUNTER
Patient was due for yrly f/u 12/2020 - plans to f/u in April 2021 - please address question of whether any testing recommended prior to visit.  mg

## 2021-06-15 NOTE — TELEPHONE ENCOUNTER
----- Message from REX Wheeler sent at 2/5/2021  5:02 PM CST -----  I called Pt to schedule for recall, pt wanted to know if there was anything [employer] wanted her to have done prior to her scheduling an appointment w/ device & KML . Pt is currently out of [place] not sure when coming back. Estimates possibly April.  Says she is doing fine.  [employer] Pacemaker - she is scheduled for a remote 2/16.

## 2021-06-16 PROBLEM — I48.21 PERMANENT ATRIAL FIBRILLATION (H): Status: ACTIVE | Noted: 2020-03-11

## 2021-06-16 PROBLEM — Z95.0 CARDIAC PACEMAKER IN SITU: Status: ACTIVE | Noted: 2017-06-06

## 2021-06-16 PROBLEM — R00.1 BRADYCARDIA: Status: ACTIVE | Noted: 2020-06-11

## 2021-06-16 NOTE — PROGRESS NOTES
[facility] Rehabilitation Certification Request    April 8, 2021      Patient: Ayanna Argueta  MR Number: [id number]  YOB: 1931  Date of Visit: 4/8/2021      Dear Kristyn Moctezuma, CNP:    Thank you for this referral.   We are seeing Ayanna Argueta in Physical Therapy for Peripheral neuralgia.    Medicare and/or Medicaid requires physician review and approval of the treatment plan. Please review the plan of care and verify that you agree with the therapy plan of care by co-signing this note.      Plan of Care  Authorization / Certification Start Date: 04/08/21  Authorization / Certification End Date: 06/07/21  Authorization / Certification Number of Visits: 10  Patient Related Instruction: Nature of Condition;Body mechanics;Posture;Treatment plan and rationale;Precautions;Next steps;Self Care instruction;Expected outcome;Basis of treatment  Times per Week: 2-1  Number of Weeks: 8  Number of Visits: 10  Discharge Planning: to include HEP  Precautions / Restrictions : cardia pacemaker, a-fib  Therapeutic Exercise: ROM;Stretching;Strengthening  Neuromuscular Reeducation: posture;balance/proprioception;core  Manual Therapy: soft tissue mobilization;myofascial release;joint mobilization      Goals:  Pt. will demonstrate/verbalize independence in self-management of condition in : 6 weeks  Pt. will be independent with home exercise program in : 6 weeks  Pt. will report decreased intensity, frequency of : Pain;in 6 weeks  Pt. will have improved quality of sleep: waking less times/night;with less pain;in 6 weeks  Pt. will show improved balance for safer : ambulation;for household ambulation;in 6 weeks  Pt. will improve posture : and demonstrate posture with minimal to no cuing    No data recorded      If you have any questions or concerns, please don't hesitate to call.    Sincerely,      Peter Mckeon, PT      Physician:    For Medicare/MA patients:      Physician recommendation:     ___ Follow  therapist's recommendation        ___ Modify therapy      *Physician co-signature indicates they certify the need for these services furnished within this plan and while under their care.       [facility]  [place]-Pelvic Initial Evaluation    Patient Name: Ayanna Argueta  Date of evaluation: 4/8/2021  Referral Diagnosis: Peripheral neuralgia  Referring provider: Kristyn Guaman CNP  Visit Diagnosis:     ICD-10-CM    1. Peripheral neuralgia  M79.2        Precautions / Restrictions : cardia pacemaker, a-fib       Assessment:      Impairments in  pain, ROM, joint mobility, strength  Patient's signs and symptoms are consistent with peripheral neuralgia vs. Lumbar radicular symptoms.  Patient responded well to exercise.  Prognosis to achieve goals is  fair   Pt. is appropriate for skilled PT intervention as outlined in the Plan of Care (POC).    Goals:  Pt. will demonstrate/verbalize independence in self-management of condition in : 6 weeks  Pt. will be independent with home exercise program in : 6 weeks  Pt. will report decreased intensity, frequency of : Pain;in 6 weeks  Pt. will have improved quality of sleep: waking less times/night;with less pain;in 6 weeks  Pt. will show improved balance for safer : ambulation;for household ambulation;in 6 weeks  Pt. will improve posture : and demonstrate posture with minimal to no cuing    No data recorded    Barriers to Learning or Achieving Goals:  No Barriers.    Patient's expectations/goals are realistic.    A shared decision making model was used.  The patient's values and choices were respected.  The following represents what was discussed and decided upon by the physical therapist and the patient.          Plan / Patient Instructions:        Plan of Care:   Authorization / Certification Start Date: 04/08/21  Authorization / Certification End Date: 06/07/21  Authorization / Certification Number of Visits: 10  Patient Related Instruction: Nature of  Condition;Body mechanics;Posture;Treatment plan and rationale;Precautions;Next steps;Self Care instruction;Expected outcome;Basis of treatment  Times per Week: 2-1  Number of Weeks: 8  Number of Visits: 10  Discharge Planning: to include HEP  Precautions / Restrictions : cardia pacemaker, a-fib  Therapeutic Exercise: ROM;Stretching;Strengthening  Neuromuscular Reeducation: posture;balance/proprioception;core  Manual Therapy: soft tissue mobilization;myofascial release;joint mobilization      POC and pathology of condition were reviewed with patient.  Pt. is in agreement with the Plan of Care  A Home Exercise Program (HEP) was initiated today.  Pt. was instructed in exercises by PT and patient was given a handout with detailed instructions.    Plan for next visit: review HEP, progress as tolerated.     Subjective:           History of Present Illness:    Ayanna is a 89 y.o. female who presents to therapy today with complaints of bilateral foot and leg pain and numbness with back pain. Date of onset:  Chronic. PT ordered 2021 . Onset was gradual. Symptoms are constant and not improving. She denies history of similar symptoms. She describes their previous level of function as not limited    Pain Ratin  Pain rating at best: 0  Pain rating at worst: 5  Pain description: aching, burning, numbness, pain and shooting    Functional limitations are described as occurring with:   ascending and descending stairs or curbs  lifting  standing    walking             Objective:      Note: Items left blank indicates the item was not performed or not indicated at the time of the evaluation.    Patient Outcome Measures :    No data recorded   Scores range from 0-100%, where a score of 0% represents minimal pain and maximal function. The minimal clinically important difference is a score reduction of 12%.    Examination  1. Peripheral neuralgia       Precautions / Restrictions : cardia pacemaker, a-fib     Involved side:  Bilateral  Posture Observation:      Cervical:  Moderate forward head  Shoulder/Thoracic complex: Moderate bilateral scapular protraction   Moderately increased upper thoracic kyphosis  Lumbopelvic complex: lateral shift    Lumbar ROM:    Date: 04/08/21      *Indicate scale AROM AROM AROM   Lumbar Flexion mod     Lumbar Extension severe      Right Left Right Left Right Left   Lumbar Sidebending severe severe       Lumbar Rotation mod mod       Thoracic Flexion      Thoracic Extension      Thoracic Sidebending         Thoracic Rotation           Lower Extremity Strength:    Date: 04/08/21      LE strength/5 Right Left Right Left Right Left   Hip Flexion (L1-3) 4 4       Hip Extension (L5-S1) 4 4       Hip Abduction (L4-5) 4 4       Hip Adduction (L2-3) 4 4       Hip External Rotation         Hip Internal Rotation         Knee Extension (L3-4) 5 5       Knee Flexion 4 4       Ankle Dorsiflexion (L4-5) 5 5       Great Toe Extension (L5)         Ankle Plantar flexion (S1)         Abdominals fair       Sensation            Reflex Testing:     Lumbar Dermatomes Right Left UE Reflexes Right Left   Iliac Crest and Groin (L1)   Biceps (C5-6)     Anterior Medial Thigh (L2)   Brachioradialis (C5-6)     Anterior Thigh, Medial Epicondyle Femur (L3)   Triceps (C7-8)     Lateral Thigh, Anterior Knee, Medial Leg/Malleolus (L4)   Ruchi s test     Lateral Leg, Dorsal Foot (L5)   LE Reflexes     Lateral Foot (S1)   Patellar (L3-4)     Posterior Leg (S2)   Achilles (S1-2)     Other:   Babinski Response       Palpation:  NT    Flexibility:  Limited hamstrings    Lumbar Special Tests:      Lumbar Special Tests Right Left SI Tests Right  Left   Quadrant test   SI Compression     Straight leg raise   SI Distraction     Crossover response   POSH Test     Slump   Sacral Thrust     Sit-up test  FADIR     Trunk extensor endurance test  Resisted Abduction     Prone instability test  Other:     Pubic shotgun  Other:       Repeated Motion  "Testing:  NT    Passive Mobility - Joint Integrity:  Not tested  Not indicated    LE Screen:  Hip PROM:  Limited extension.  Hip Scour:  NT.    Treatment Today     Exercises:  Exercise #1: supine priformis stretch  Comment #1: 30\" x 2 bilateral  Exercise #2: supine LE nerve glide  Comment #2: 10 bilateral  Exercise #3: TA set  Comment #3: 5\" x 10  Exercise #4: LTR  Comment #4: 10 bilateral             TREATMENT MINUTES COMMENTS   Evaluation 30    Self-care/ Home management     Manual therapy      Neuromuscular Re-education     Therapeutic Activity     Therapeutic Exercises 25    Gait training     Modality__________________                Total 55    Blank areas are intentional and mean the treatment did not include these items.     PT Evaluation Code: (Please list factors)  Patient History/Comorbidities:   Patient Active Problem List   Diagnosis     Cardiac pacemaker, single, in situ     Permanent atrial fibrillation (H)     Bradycardia      Past Medical History:   Diagnosis Date     Atrial fibrillation (H)      Valvular disease 2016    Severe aortic valve stenosis,Moderate mitral and tricuspid regurgitation      Examination: as above  Clinical Presentation: stable  Clinical Decision Making: low complexity    Patient History/  Comorbidities Examination  (body structures and functions, activity limitations, and/or participation restrictions) Clinical Presentation Clinical Decision Making (Complexity)   No documented Comorbidities or personal factors 1-2 Elements Stable and/or uncomplicated Low   1-2 documented comorbidities or personal factor 3 Elements Evolving clinical presentation with changing characteristics Moderate   3-4 documented comorbidities or personal factors 4 or more Unstable and unpredictable High               Peter Mckeon, PT  4/8/2021  8:12 AM                "

## 2021-06-16 NOTE — PROGRESS NOTES
"[facility] Rehabilitation Daily Progress     Patient Name: Ayanna Argueta  Date: 4/19/2021  Visit #: 2/10  Authorization dates:  4/8/21-6/7/21  Referral Diagnosis: Peripheral neuralgia  Referring provider: Kristyn Guaman CNP  Visit Diagnosis:     ICD-10-CM    1. Peripheral neuralgia  M79.2        Precautions / Restrictions : cardia pacemaker, a-fib       Assessment:     Response to Intervention:  Tolerated new exercises well.    Symptoms are consistent with:  peripheral neuralgia vs. Lumbar radicular symptoms  Patient is appropriate to continue with skilled physical therapy intervention, as indicated by initial plan of care.    Goal Status:  Pt. will demonstrate/verbalize independence in self-management of condition in : 6 weeks  Pt. will be independent with home exercise program in : 6 weeks  Pt. will report decreased intensity, frequency of : Pain;in 6 weeks  Pt. will have improved quality of sleep: waking less times/night;with less pain;in 6 weeks  Pt. will show improved balance for safer : ambulation;for household ambulation;in 6 weeks  Pt. will improve posture : and demonstrate posture with minimal to no cuing    No data recorded  Other functional progress:           Plan / Patient Education:     Continue with initial plan of care.  Progress with home program as tolerated.       Subjective:     Pain Rating:  Resting 0  Activity:  0    Response to last treatment: feeling ok  HEP- Frequency: 1-2x/day, Questions or difficulties:  none.    Patient reports:      She feels like it is helping legs, but has not noticed much change in the feet.      Objective:       Audible crepitus with sit to stand      Treatment Today   Manual Therapy  None today    Exercises:  Exercise #1: supine priformis stretch  Comment #1: 30\" x 2 bilateral  Exercise #2: supine LE nerve glide  Comment #2: 10 bilateral  Exercise #3: TA set-- with cuing for normal breathing.  Comment #3: 5\" x 10  Exercise #4: LTR  Comment #4: 10 " bilateral  Exercise #5: bridging  Comment #5: 10 no hold  Exercise #6: seated LE nerve glide/LAQ  Comment #6: 10 bilateral  Exercise #7: sit to stand hands on thighs  Comment #7: 5            TREATMENT MINUTES COMMENTS   Evaluation     Self-care/ Home management     Manual therapy      Neuromuscular Re-education     Therapeutic Activity     Therapeutic Exercises 25 See exercise flow-sheet for details.    Gait training     Modality__________________                Total 25    Blank areas are intentional and mean the treatment did not include these items.       Peter Mckeon, PT  4/19/2021    [facility]

## 2021-06-16 NOTE — PATIENT INSTRUCTIONS - HE
1. Continue current medications although could decrease furosemide to 40 mg daily to see if it helps with mouth dryness.  2. Follow up in 1 year with echo.

## 2021-06-16 NOTE — PROGRESS NOTES
In clinic device check with Dr. Sosa.  Please see link for full device report.  Patient was informed of results and next follow up during today's visit.

## 2021-06-17 NOTE — PROGRESS NOTES
"[facility] Rehabilitation Daily Progress     Patient Name: Ayanna Argueta  Date: 4/26/2021  Visit #: 3/10  Authorization dates:  4/8/21-6/7/21  Referral Diagnosis: Peripheral neuralgia  Referring provider: Kristyn Guaman CNP  Visit Diagnosis:     ICD-10-CM    1. Peripheral neuralgia  M79.2        Precautions / Restrictions : cardia pacemaker, a-fib       Assessment:     Response to Intervention:  Tolerated new exercises well.  Good overall progress.    Symptoms are consistent with:  peripheral neuralgia vs. Lumbar radicular symptoms  Patient is appropriate to continue with skilled physical therapy intervention, as indicated by initial plan of care.    Goal Status:  Pt. will demonstrate/verbalize independence in self-management of condition in : 6 weeks  Pt. will be independent with home exercise program in : 6 weeks  Pt. will report decreased intensity, frequency of : Pain;in 6 weeks  Pt. will have improved quality of sleep: waking less times/night;with less pain;in 6 weeks  Pt. will show improved balance for safer : ambulation;for household ambulation;in 6 weeks  Pt. will improve posture : and demonstrate posture with minimal to no cuing    No data recorded  Other functional progress:           Plan / Patient Education:     Continue with initial plan of care.  Progress with home program as tolerated.       Subjective:     Pain Rating:  Resting 0  Activity:  0    Response to last treatment: feeling ok  HEP- Frequency: 1-2x/day, Questions or difficulties:  Some cramping in hamstrings with bridging.    Patient reports:      The legs are feeling better.    The feet feel about the same.      Objective:           Treatment Today   Manual Therapy  None today    Exercises:  Exercise #1: supine priformis stretch  Comment #1: 30\" x 2 bilateral  Exercise #2: supine LE nerve glide  Comment #2: 10 bilateral  Exercise #3: TA set-- with cuing for normal breathing.  Comment #3: 5\" x 10  Exercise #4: LTR  Comment #4: 10 " bilateral  Exercise #5: bridging  Comment #5: verbal review, instruction to move heels a little closer to her gluts to try to reduce chanced of getting a cramp in her hamstring.  Exercise #6: seated LE nerve glide/LAQ  Comment #6: 10 bilateral  Exercise #7: sit to stand hands on thighs  Comment #7: 8  Exercise #8: nustep with discussion of symptoms, prgress, and goals.  Comment #8: 5 minutes at resistance 5  Exercise #9: standing hip abduction  Comment #9: 10 reps bilateral  Exercise #10: normal stance eyes closed  Comment #10: at bars   5-8 seconds on average, many trials.            TREATMENT MINUTES COMMENTS   Evaluation     Self-care/ Home management     Manual therapy      Neuromuscular Re-education     Therapeutic Activity     Therapeutic Exercises 25 See exercise flow-sheet for details.    Gait training     Modality__________________                Total 25    Blank areas are intentional and mean the treatment did not include these items.       Peter Mckeon, PT  4/26/2021    [facility]

## 2021-06-17 NOTE — TELEPHONE ENCOUNTER
Telephone Encounter by Ana Mcknight RN at 2/9/2021  3:04 PM     Author: Ana Mcknight RN Service: -- Author Type: Registered Nurse    Filed: 2/9/2021  3:06 PM Encounter Date: 2/8/2021 Status: Signed    : Ana Mcknight RN (Registered Nurse)       Msg rec'd 2-9-21 @ 1454:  Renetta Sosa MD Gorshe, Maureen, RN         No testing needed prior to visit.     KML

## 2021-06-17 NOTE — PROGRESS NOTES
Progress Notes by Peter Mckeon PT at 5/3/2021 10:15 AM     Author: Peter Mckeon PT Service: -- Author Type: Physical Therapist    Filed: 8/10/2021  5:18 PM Encounter Date: 5/3/2021 Status: Addendum    : Peter Mckeon PT (Physical Therapist)    Related Notes: Original Note by Peter Mckeon PT (Physical Therapist) filed at 5/3/2021  1:12 PM       [facility] Discharge Summary  Patient Name: Ayanna Argueta  Date: 8/10/2021  Referral Diagnosis: Peripheral neuralgia  Referring provider: Kristyn Guaman CNP  Visit Diagnosis:   1. Peripheral neuralgia         Goals:  No data recorded  No data recorded    Patient was seen for 4 visits ending on 5/3/21.  A trial of independent self management was initiated.  The patient did not return to continue any physical therapy.  Please see attached note for patient status.    Therapy will be discontinued at this time.     Thank you for your referral.  Peter Mckeon  8/10/2021  5:17 PM     [facility] Daily Progress     Patient Name: Ayanna Argueta  Date: 5/3/2021  Visit #: 4/10  Authorization dates:  4/8/21-6/7/21  Referral Diagnosis: Peripheral neuralgia  Referring provider: Kristyn Guaman CNP  Visit Diagnosis:     ICD-10-CM    1. Peripheral neuralgia  M79.2        Precautions / Restrictions : cardia pacemaker, a-fib       Assessment:     Response to Intervention:  Tolerated new exercises well.  Good overall progress.  Trial of self management.    Symptoms are consistent with:  peripheral neuralgia vs. Lumbar radicular symptoms  Patient is appropriate to continue with skilled physical therapy intervention, as indicated by initial plan of care.    Goal Status:  Pt. will demonstrate/verbalize independence in self-management of condition in : 6 weeks;Progressing toward  Pt. will be independent with home exercise program in : 6 weeks;Progressing toward  Pt. will report decreased intensity, frequency of :  "Pain;in 6 weeks;Met  Pt. will have improved quality of sleep: waking less times/night;with less pain;in 6 weeks;Met  Pt. will show improved balance for safer : ambulation;for household ambulation;in 6 weeks;Progressing toward;Partially met  Pt. will improve posture : and demonstrate posture with minimal to no cuing;in 6 weeks;Progressing toward;Partially met    No data recorded  Other functional progress:           Plan / Patient Education:     Trial of independent self-management of condition initiated.  Patient to contact PT by phone or schedule an appointment as needed if symptoms increase or progress stops.  If patient has not returned to continue therapy in 30 days then physical therapy will be discharged.      Subjective:     Pain Rating:  Resting 0  Activity:  0    Response to last treatment: feeling ok  HEP- Frequency: 1-2x/day, Questions or difficulties:  none    Patient reports:      No pain.      She is surprised at how well she is able to do the eyes closed balance after practicing it.    It has definitely helped the legs, but the feet are about the same.    She is comfortable with the HEP and continuing on with the exercises independently          Objective:           Treatment Today   Patient instruction for and trial for putting on compression stockings.    Exercises:  Exercise #1: supine priformis stretch  Comment #1: 30\" x 2 bilateral not today  Exercise #2: supine LE nerve glide  Comment #2: 10 bilateral  Exercise #3: TA set-- with cuing for normal breathing.  Comment #3: 5\" x 10  Exercise #4: LTR  Comment #4: 10 bilateral  Exercise #5: bridging  Comment #5: verbal review,   she is doing 6 reps 2-3 times a day.  she is getting less cramps in hamstring with bridging and if it happens it is on the first one..  Exercise #6: seated LE nerve glide/LAQ  Comment #6: 10 bilateral  Exercise #7: sit to stand hands on thighs  Comment #7: 8  Exercise #8: nustep with discussion of symptoms, prgress, and " "goals.  Comment #8: 3 minutes at resistance 5  Exercise #9: standing hip abduction- cues for posture and isolation of glut med  Comment #9: 10 reps bilateral  Exercise #10: normal stance eyes closed  Comment #10: at bars   30\" x 2.            TREATMENT MINUTES COMMENTS   Evaluation     Self-care/ Home management 10    Manual therapy      Neuromuscular Re-education     Therapeutic Activity     Therapeutic Exercises 15 See exercise flow-sheet for details.    Gait training     Modality__________________                Total 25    Blank areas are intentional and mean the treatment did not include these items.       Peter Mckeon, PT  5/3/2021    [facility]        "

## 2021-06-19 NOTE — LETTER
Letter by Shana Hernandez RDCS at      Author: Shana Hernandez RDCS Service: -- Author Type: --    Filed:  Encounter Date: 3/14/2019 Status: (Other)       Ayanna Argueta  [address] [place] [address]      March 14, 2019      Dear Ms. Arugeta,    RE: Remote Results    We are writing to you regarding your recent Remote Pacemaker check from home. Your transmission was received successfully. Battery status is satisfactory at this time.     Your results are within normal limits.    Your next device appointment will be a remote check on June 17, 2019; this will occur automatically.    To schedule or reschedule, please call [phone] and press 1.    NOTE: If you would like to do an extra transmission, please call [phone] and press 3 to speak to a nurse BEFORE transmitting. This ensures that the Device Clinic staff is aware of the reason you are sending a transmission, and can follow-up with you after it has been reviewed.    We will be checking your implanted device from home (remotely) every three months unless otherwise instructed. We will need to see you in the clinic at least once a year. You may need to be seen in the clinic sooner depending on the results of your check.    Please be aware:    The follow-up schedule is like a Physician prescription.    Your remote monitor is paired to your specific implanted device.      Sincerely,    [facility]

## 2021-06-19 NOTE — LETTER
Letter by Janie Buckley at      Author: Janie Buckley Service: -- Author Type: --    Filed:  Encounter Date: 6/17/2019 Status: (Other)         Ayanna Argueta  [address]   [place] [address]      June 17, 2019      Dear Ms. Argueta,    RE: Remote Results    We are writing to you regarding your recent Remote Pacemaker check from home. Your transmission was received successfully. Battery status is satisfactory at this time.     Your results are within normal limits.    Your next device appointment will be a remote check on September 18, 2019; this will occur automatically.    To schedule or reschedule, please call [phone] and press 1.    NOTE: If you would like to do an extra transmission, please call [phone] and press 3 to speak to a nurse BEFORE transmitting. This ensures that the Device Clinic staff is aware of the reason you are sending a transmission, and can follow-up with you after it has been reviewed.    We will be checking your implanted device from home (remotely) every three months unless otherwise instructed. We will need to see you in the clinic at least once a year. You may need to be seen in the clinic sooner depending on the results of your check.    Please be aware:    The follow-up schedule is like a Physician prescription.    Your remote monitor is paired to your specific implanted device.      Sincerely,    [facility]

## 2021-06-19 NOTE — LETTER
Letter by Shana Hernandez RDCS at      Author: Shana Hernandez RDCS Service: -- Author Type: --    Filed:  Encounter Date: 9/18/2019 Status: (Other)         Ayanna Argueta  [address] [place] [address]      September 18, 2019      Dear Ms. Argueta,    RE: Remote Results    We are writing to you regarding your recent Remote Pacemaker check from home. Your transmission was received successfully. Battery status is satisfactory at this time.     Your results are showing no significant changes.    Your next device appointment will be a clinic visit.  Please call in late September to schedule.      To schedule or reschedule, please call [phone] and press 1.    NOTE: If you would like to do an extra transmission, please call [phone] and press 3 to speak to a nurse BEFORE transmitting. This ensures that the Device Clinic staff is aware of the reason you are sending a transmission, and can follow-up with you after it has been reviewed.    We will be checking your implanted device from home (remotely) every three months unless otherwise instructed. We will need to see you in the clinic at least once a year. You may need to be seen in the clinic sooner depending on the results of your check.    Please be aware:    The follow-up schedule is like a Physician prescription.    Your remote monitor is paired to your specific implanted device.      Sincerely,    [facility]

## 2021-06-20 NOTE — LETTER
Letter by Janie Buckley at      Author: Janie Buckley Service: -- Author Type: --    Filed:  Encounter Date: 3/11/2020 Status: (Other)         Ayanna Argueta  [address]   [place] [address]      March 11, 2020      Dear Ms. Argueta,    RE: Remote Results    We are writing to you regarding your recent Remote Pacemaker check from home. Your transmission was received successfully. Battery status is satisfactory at this time.     Your results are within normal limits.    Your next device appointment will be a remote check on June 11, 2020; this will occur automatically.    To schedule or reschedule, please call [phone] and press 1.    NOTE: If you would like to do an extra transmission, please call [phone] and press 3 to speak to a nurse BEFORE transmitting. This ensures that the Device Clinic staff is aware of the reason you are sending a transmission, and can follow-up with you after it has been reviewed.    We will be checking your implanted device from home (remotely) every three months unless otherwise instructed. We will need to see you in the clinic at least once a year. You may need to be seen in the clinic sooner depending on the results of your check.    Please be aware:    The follow-up schedule is like a Physician prescription.    Your remote monitor is paired to your specific implanted device.      Sincerely,    [facility]

## 2021-06-20 NOTE — LETTER
Letter by Janie Buckley at      Author: Janie Buckley Service: -- Author Type: --    Filed:  Encounter Date: 9/10/2020 Status: (Other)         Ayanna Argueta  [address]   [place] [address]      September 10, 2020      Dear Ms. Argueta,    RE: Remote Results    We are writing to you regarding your recent Remote Pacemaker check from home. Your transmission was received successfully. Battery status is satisfactory at this time.     Your results are within normal limits.    Your next device appointment will be a clinic visit.  Please call in mid October to schedule.      To schedule or reschedule, please call [phone] and press 1.    NOTE: If you would like to do an extra transmission, please call [phone] and press 3 to speak to a nurse BEFORE transmitting. This ensures that the Device Clinic staff is aware of the reason you are sending a transmission, and can follow-up with you after it has been reviewed.    We will be checking your implanted device from home (remotely) every three months unless otherwise instructed. We will need to see you in the clinic at least once a year. You may need to be seen in the clinic sooner depending on the results of your check.    Please be aware:    The follow-up schedule is like a Physician prescription.    Your remote monitor is paired to your specific implanted device.      Sincerely,    [facility]

## 2021-06-20 NOTE — LETTER
Letter by Yamilex Mo RDCS at      Author: Yamilex Mo RDCS Service: -- Author Type: --    Filed:  Encounter Date: 6/11/2020 Status: (Other)         Ayanna Argueta  [address] [place] [address]      June 11, 2020      Dear Ms. Argueta,    RE: Remote Results    We are writing to you regarding your recent Remote Pacemaker check from home. Your transmission was received successfully. Battery status is satisfactory at this time.     Your results are showing no significant changes.    Your next device appointment will be a remote check on September 10th, 2020; this will occur automatically.    To schedule or reschedule, please call [phone] and press 1.    NOTE: If you would like to do an extra transmission, please call [phone] and press 3 to speak to a nurse BEFORE transmitting. This ensures that the Device Clinic staff is aware of the reason you are sending a transmission, and can follow-up with you after it has been reviewed.    We will be checking your implanted device from home (remotely) every three months unless otherwise instructed. We will need to see you in the clinic at least once a year. You may need to be seen in the clinic sooner depending on the results of your check.    Please be aware:    The follow-up schedule is like a Physician prescription.    Your remote monitor is paired to your specific implanted device.      Sincerely,    [facility]

## 2021-06-21 NOTE — LETTER
Letter by Tanika Cortez RN at      Author: Tanika Cortez RN Service: -- Author Type: --    Filed:  Encounter Date: 3/3/2021 Status: (Other)         Ayanna Argueta  [address]   [place] [address]      March 3, 2021      Dear Ms. Argueta,    RE: Remote Results    We are writing to you regarding your recent Remote Pacemaker check from home. Your transmission was received successfully. Battery status is satisfactory at this time.     Your results are showing no significant changes.    Your next device appointment will be a clinic visit on 3/22/2021 at our  [place] Location, [facility] [facility], [address].    To schedule or reschedule, please call [phone] and press 1.    NOTE: If you would like to do an extra transmission, please call [phone] and press 3 to speak to a nurse BEFORE transmitting. This ensures that the Device Clinic staff is aware of the reason you are sending a transmission, and can follow-up with you after it has been reviewed.    We will be checking your implanted device from home (remotely) every three months unless otherwise instructed. We will need to see you in the clinic at least once a year. You may need to be seen in the clinic sooner depending on the results of your check.    Please be aware:    The follow-up schedule is like a Physician prescription.    Your remote monitor is paired to your specific implanted device.      Sincerely,    [facility]

## 2021-06-22 NOTE — PROGRESS NOTES
In clinic device check with Device RN followed by office visit with Dr. Sosa.  Please see link for full device report.  Patient was informed of results and next follow up during today's visit.

## 2021-06-25 NOTE — PROGRESS NOTES
Progress Notes by Renetta Sosa MD at 6/6/2017  9:10 AM     Author: Renetta Sosa MD Service: -- Author Type: Physician    Filed: 6/7/2017  4:29 PM Encounter Date: 6/6/2017 Status: Signed    : Renetta Sosa MD (Physician)           Click to link to [facility] HEART CARE NOTE    Thank you, Dr. Kristyn Guaman, for asking the [facility] Heart Care team to see Ms. Ayanna Argueta to establish care following TAVR and pacemaker implant at the [facility] 6 months ago.    Assessment/Recommendations   Assessment:    1.  History of severe aortic valve stenosis, status post TAVR at the [facility] in late October 2016.  Patient had recent echocardiogram performed in late March demonstrating normal prosthetic valve function, normal left ventricular systolic function, mild mitral regurgitation and moderate tricuspid regurgitation.  Estimated pulmonary artery pressures were at upper limits of normal.  She is doing fairly well although does complain of dyspnea on exertion which she states is new.  She did not complete cardiac rehab following her procedure and will look into whether she has some additional rehab sessions she can do this summer.  We did talk about cutting back her beta-blocker to see if she notes any improvement.  2.  Chronic atrial fibrillation, status post permanent pacemaker insertion following valve replacement for heart block and slow ventricular response.  Pacemaker interrogation today demonstrates normal device function.  No evidence of high ventricular rates.  She remains on warfarin anticoagulation to minimize risks of thromboembolic events which is managed through a physician in [place], [place].    Plan:  1.  Decrease metoprolol succinate to 75 mg daily  2.  Follow-up echo in late October early November with follow-up visit in early December       History of Present Illness    Ms. Ayanna Argueta is a 86 y.o. female with history of chronic atrial fibrillation and severe  aortic valve stenosis, followed by the [facility], who underwent transcatheter aortic valve replacement in late October 2016.  Postoperatively, she developed complete heart block and slow ventricular response to atrial fibrillation requiring implantation of a permanent pacemaker.  She did do some cardiac rehab initially through the [facility] and continued some cardiac rehab at [facility] before heading to [place] for the winter months.  She did have a follow-up echocardiogram performed in March demonstrating normal prosthetic valve function, normal left ventricular systolic function, mild mitral and moderate tricuspid regurgitation.    Since her procedure, she does report symptoms of exertional dyspnea which she does not recall prior to her valve replacement.  I suspect some of this is due to cardiovascular deconditioning and I suggested she may want to see if she has any additional cardiac rehab sessions left that she could complete this summer.  She reports no orthopnea, PND or lower extremity edema.  No lightheadedness or near syncope.    ECG (personally reviewed): No ECG performed today.  Device check demonstrates a presenting rhythm of atrial fibrillation with ventricular rate in the 60s.  She is paced 75% of the time in the ventricle.  Hysterogram shows good heart rate variability from .  No evidence of ventricular tachycardia.  Lead and battery status good.    ECHO (personally reviewed): Recent echo through the [facility] as reported above      Physical Examination Review of Systems   Vitals:    06/06/17 0824   BP: 90/60   Pulse: 64   Resp: 16     Body mass index is 25.66 kg/(m^2).  Wt Readings from Last 3 Encounters:   06/06/17 153 lb (69.4 kg)   12/19/16 151 lb 12.8 oz (68.9 kg)   12/16/16 153 lb 6.4 oz (69.6 kg)     General Appearance:   Awake, Alert, No acute distress.   HEENT:  No scleral icterus; the mucous membranes were pink and moist.   Neck: No cervical bruits or jugular venous distention     Chest: The spine was straight. The chest was symmetric.   Lungs:   Respirations unlabored; the lungs are clear to auscultation. No wheezing   Cardiovascular:    mildly irregular rhythm.  S1, S2 normal.  No gallop or rub. 2/6 holosystolic murmur at left sternal border.    Abdomen:  No organomegaly, masses, bruits, or tenderness. Bowels sounds are present   Extremities:  no peripheral edema bilaterally    Skin: No xanthelasma. Warm, Dry.   Musculoskeletal: No tenderness.   Neurologic: Mood and affect are appropriate.    General: WNL  Eyes: WNL  Ears/Nose/Throat: WNL  Lungs: Shortness of Breath  Heart: Shortness of Breath with activity  Stomach: WNL  Bladder: WNL  Muscle/Joints: WNL  Skin: WNL  Nervous System: WNL  Mental Health: WNL     Blood: WNL     Medical History  Surgical History Family History Social History   -h/o AS, s/p TAVR  -atrial fibrillation No past surgical history on file. No family history of early CAD. Brother with AVR Social History     Social History   ? Marital status:      Spouse name: N/A   ? Number of children: N/A   ? Years of education: N/A     Occupational History   ? Not on file.     Social History Main Topics   ? Smoking status: Never Smoker   ? Smokeless tobacco: Not on file   ? Alcohol use Not on file   ? Drug use: Not on file   ? Sexual activity: Not on file     Other Topics Concern   ? Not on file     Social History Narrative   ? No narrative on file          Medications  Allergies   Current Outpatient Prescriptions   Medication Sig Dispense Refill   ? aspirin 81 MG EC tablet Take 81 mg by mouth daily.     ? calcium carbonate (OS-JIGAR) 600 mg (1,500 mg) tablet Take 600 mg by mouth 2 (two) times a day with meals.     ? diphenhydrAMINE (BENADRYL) 25 mg capsule Take 25 mg by mouth daily.     ? furosemide (LASIX) 40 MG tablet Take 40 mg by mouth 2 (two) times a day.     ? Lactobacillus rhamnosus GG (CULTURELLE) 10-15 Billion cell capsule Take 1 capsule by mouth daily.     ?  levothyroxine (SYNTHROID, LEVOTHROID) 150 MCG tablet Take 150 mcg by mouth daily.     ? losartan (COZAAR) 100 MG tablet Take 100 mg by mouth daily.     ? magnesium oxide (MAG-OX) 400 mg tablet Take 400 mg by mouth daily.     ? melatonin 3 mg Tab tablet Take 5 mg by mouth bedtime as needed.     ? metoprolol succinate (TOPROL-XL) 100 MG 24 hr tablet Take 50 mg by mouth daily.     ? multivitamin with minerals (THERA-M) 9 mg iron-400 mcg Tab tablet Take 1 tablet by mouth daily.     ? naproxen sodium (ALEVE) 220 MG tablet Take 220 mg by mouth as needed for pain.     ? potassium chloride (MICRO-K) 10 mEq CR capsule Take 10 mEq by mouth 4 (four) times a day.     ? simvastatin (ZOCOR) 40 MG tablet Take 40 mg by mouth bedtime.     ? warfarin (COUMADIN) 2 MG tablet Take 2 mg by mouth daily. Take 1 tablet (2 mg) by mouth daily. Adjust dose based on INR results as directed.     ? metoprolol succinate (TOPROL-XL) 25 MG Take 1 tablet (25 mg total) by mouth daily. 30 tablet 1     No current facility-administered medications for this visit.       Allergies   Allergen Reactions   ? Cardizem [Diltiazem Hcl]      Stomach upset   ? Excedrin Extra Strength [Aspirin-Acetaminophen-Caffeine]      ulcer   ? Gabapentin    ? Hydrocodone-Acetaminophen      stomach upset   ? Sulfa (Sulfonamide Antibiotics)    ? Trees          Lab Results    Chemistry/lipid CBC Cardiac Enzymes/BNP/TSH/INR   Lab Results   Component Value Date    CHOL 160 12/08/2016    HDL 43 (L) 12/08/2016    LDLCALC 62 12/08/2016    TRIG 274 (H) 12/08/2016    CREATININE 1.29 (H) 05/10/2017    BUN 25 05/10/2017    K 4.3 05/10/2017     05/10/2017     05/10/2017    CO2 29 05/10/2017    No results found for: WBC, HGB, HCT, MCV, PLT Lab Results   Component Value Date    TSH 2.80 12/08/2016    INR 2.03 (H) 06/26/2009

## 2021-06-27 NOTE — PROGRESS NOTES
Progress Notes by Renetta Sosa MD at 12/12/2018 11:30 AM     Author: Renetta Sosa MD Service: -- Author Type: Physician    Filed: 12/12/2018 11:55 AM Encounter Date: 12/12/2018 Status: Signed    : Renetta Sosa MD (Physician)           Click to link to [facility] HEART CARE NOTE    Thank you, Dr. Guaman, for asking the [facility] Heart Care team to see Ms. Ayanna Argueta to follow-up on her transcatheter aortic valve replacement and pacemaker placement for chronic atrial fibrillation with slow ventricular response.    Assessment/Recommendations   Assessment:    1.  Status post transcatheter aortic valve replacement for aortic valve stenosis.  Ayanna continues to do well with no specific complaints.  Her chronic exertional dyspnea has actually improved as she has tried to remain more active.  At this point, would continue with her current medical therapy.  2.  Chronic atrial fibrillation status post single lead pacemaker insertion for slow ventricular response.  This occurred after her aortic valve replacement.  Pacemaker check today demonstrates normal device function.  3.  Mild essential hypertension, well controlled  4.  Hyperlipidemia, well controlled with simvastatin      Plan:  1.  Continue current medications  2.  Follow-up in 1 year or sooner if clinically indicated.       History of Present Illness    Ms. Ayanna Argueta is a 87 y.o. female with history of transcatheter aortic valve replacement for aortic valve stenosis, chronic atrial fibrillation status post permanent pacemaker insertion for slow ventricular response following her valve procedure who presents today for a follow-up visit.  She has had chronic exertional dyspnea since her valve replacement although tells me today that this has improved with more regular exercise and climbing stairs.  I told her she may have some early diastolic dysfunction which can be benefited with continued exercise.  She denies any exertional  chest discomfort.  No orthopnea, PND or lower extremity edema.    ECG (personally reviewed): No ECG today.  Pacemaker check was performed in conjunction with her visit.  Her presenting rhythm is atrial fibrillation with ventricular pacing.  She paces 84% in the ventricle.  No ventricular arrhythmias identified.  Lead and battery status stable.  No programming changes made.    Cardiac Imaging Studies (personally reviewed): No recent imaging     Physical Examination Review of Systems   Vitals:    12/12/18 1047   BP: 118/74   Pulse: 72   Resp: 18     Body mass index is 25.75 kg/m .  Wt Readings from Last 3 Encounters:   12/12/18 150 lb (68 kg)   12/14/17 159 lb (72.1 kg)   11/10/17 154 lb (69.9 kg)     General Appearance:   Awake, Alert, No acute distress.   HEENT:  No scleral icterus; the mucous membranes were pink and moist.   Neck: No cervical bruits or jugular venous distention    Chest: The spine was straight. The chest was symmetric.   Lungs:   Respirations unlabored; the lungs are clear to auscultation. No wheezing   Cardiovascular:    Regular rate and rhythm.  S1, S2 normal.  1/6 systolic ejection type murmur heard along the left sternal border.  No other murmurs.   Abdomen:  No organomegaly, masses, bruits, or tenderness. Bowels sounds are present   Extremities:  No peripheral edema, clubbing or cyanosis.   Skin: No xanthelasma. Warm, Dry.   Musculoskeletal: No tenderness.   Neurologic: Mood and affect are appropriate.    General: WNL  Eyes: WNL  Ears/Nose/Throat: WNL  Lungs: WNL  Heart: WNL  Stomach: WNL  Bladder: WNL  Muscle/Joints: WNL  Skin: WNL  Nervous System: WNL  Mental Health: WNL     Blood: WNL     Medical History  Surgical History Family History Social History   Past Medical History:   Diagnosis Date   ? Atrial fibrillation (H)    ? Valvular disease 2016    Severe aortic valve stenosis,Moderate mitral and tricuspid regurgitation    Past Surgical History:   Procedure Laterality Date   ? CARDIAC VALVE  REPLACEMENT  10/2016    TAVR    No family history on file. Social History     Socioeconomic History   ? Marital status:      Spouse name: Not on file   ? Number of children: Not on file   ? Years of education: Not on file   ? Highest education level: Not on file   Social Needs   ? Financial resource strain: Not on file   ? Food insecurity - worry: Not on file   ? Food insecurity - inability: Not on file   ? Transportation needs - medical: Not on file   ? Transportation needs - non-medical: Not on file   Occupational History   ? Not on file   Tobacco Use   ? Smoking status: Never Smoker   ? Smokeless tobacco: Never Used   Substance and Sexual Activity   ? Alcohol use: Not on file   ? Drug use: Not on file   ? Sexual activity: Not on file   Other Topics Concern   ? Not on file   Social History Narrative   ? Not on file          Medications  Allergies   Current Outpatient Medications   Medication Sig Dispense Refill   ? aspirin 81 MG EC tablet Take 81 mg by mouth daily.     ? calcium carbonate (OS-JIGAR) 600 mg (1,500 mg) tablet Take 600 mg by mouth 2 (two) times a day with meals.     ? furosemide (LASIX) 40 MG tablet Take 1.5 tablets (60 mg total) by mouth daily. 135 tablet 3   ? levothyroxine (SYNTHROID, LEVOTHROID) 150 MCG tablet Take 150 mcg by mouth daily.     ? losartan (COZAAR) 100 MG tablet Take 1 tablet (100 mg total) by mouth daily. 90 tablet 3   ? magnesium oxide (MAG-OX) 400 mg tablet Take 400 mg by mouth daily.     ? metoprolol succinate (TOPROL-XL) 50 MG 24 hr tablet Take 1 tablet (50 mg total) by mouth daily. 90 tablet 3   ? multivitamin with minerals (THERA-M) 9 mg iron-400 mcg Tab tablet Take 1 tablet by mouth daily.     ? naproxen sodium (ALEVE) 220 MG tablet Take 220 mg by mouth as needed for pain.     ? potassium chloride (MICRO-K) 10 mEq CR capsule Take 10 mEq by mouth daily.      ? simvastatin (ZOCOR) 40 MG tablet Take 1 tablet (40 mg total) by mouth at bedtime. 90 tablet 3   ? warfarin  (COUMADIN/JANTOVEN) 2 MG tablet Take 1 tablet (2 mg total) by mouth daily Take 1 tablet (2 mg) by mouth daily. Adjust dose based on INR results as directed. . 90 tablet 3   ? diphenhydrAMINE (BENADRYL) 25 mg capsule Take 25 mg by mouth daily.     ? Lactobacillus rhamnosus GG (CULTURELLE) 10-15 Billion cell capsule Take 1 capsule by mouth daily.     ? melatonin 3 mg Tab tablet Take 5 mg by mouth bedtime as needed.       No current facility-administered medications for this visit.       Allergies   Allergen Reactions   ? Cardizem [Diltiazem Hcl]      Stomach upset   ? Excedrin Extra Strength [Aspirin-Acetaminophen-Caffeine]      ulcer   ? Gabapentin    ? Hydrocodone-Acetaminophen      stomach upset   ? Sulfa (Sulfonamide Antibiotics)    ? Trees          Lab Results    Chemistry/lipid CBC Cardiac Enzymes/BNP/TSH/INR   Lab Results   Component Value Date    CHOL 130 04/17/2018    HDL 40 (L) 04/17/2018    LDLCALC 55 04/17/2018    TRIG 175 (H) 04/17/2018    CREATININE 1.38 (H) 04/17/2018    BUN 23 04/17/2018    K 4.1 04/17/2018     04/17/2018     04/17/2018    CO2 34 (H) 04/17/2018    No results found for: WBC, HGB, HCT, MCV, PLT Lab Results   Component Value Date    TSH 0.78 04/17/2018    INR 2.03 (H) 06/26/2009

## 2021-06-28 NOTE — PROGRESS NOTES
Progress Notes by Renetta Sosa MD at 12/11/2019 12:50 PM     Author: Renetta Sosa MD Service: -- Author Type: Physician    Filed: 12/11/2019  1:43 PM Encounter Date: 12/11/2019 Status: Signed    : Renetta Sosa MD (Physician)           Thank you, Dr. Guaman, for asking the [facility] Heart Care team to see Ms. Ayanna Argueta to follow-up on chronic atrial fibrillation and transcatheter aortic valve replacement.    Assessment/Recommendations   Assessment:    1.  Aortic valve stenosis, status post transcatheter aortic valve replacement at the [facility] in 2016.  Valve tones appeared normal on examination.  Her last echocardiogram was 2 years ago so we will plan to repeat an echocardiogram for further evaluation.  2.  Chronic atrial fibrillation, status post single-chamber pacer insertion for slow ventricular response.  Device check today demonstrates normal device function.  A nonsustained run of ventricular tachycardia lasting 13 beats was documented.  Will assess echocardiogram for any structural abnormality.  3.  Nonobstructive coronary artery disease by angiography in 2016.  No formal report available.      Plan:  1.  Continue current medications  2.  Schedule echocardiogram to assess for wall motion abnormality and reassess bioprosthetic aortic valve  3.  Attempt to get cardiac catheterization report from the [facility]       History of Present Illness    Ms. Ayanna Argueta is a 88 y.o. female with history of aortic valve stenosis, status post transcatheter aortic valve replacement in 2016 at the [facility] followed by single lead pacemaker insertion for slow ventricular response to atrial fibrillation who presents today for follow-up visit.  She has been doing well herself although has noted some exertional dyspnea when walking out of the cold.  This is required her to wear a scarf around her face.  She has noted this over the last couple of years although may be slightly more prevalent this  year.  She denies any other exertional symptoms.  No orthopnea, PND or lower extremity edema.    ECG (personally reviewed): No ECG performed today.  A device check was performed in conjunction with her visit.  Her presenting rhythm is atrial fibrillation with ventricular pacing at a rate of 60.  She paces the ventricle 89% of the time.  A 13 beat run of ventricular tachycardia was noted on October 4, rate 170 bpm.  No other ventricular arrhythmias.  Lead status and battery status stable.    Cardiac Imaging Studies (personally reviewed): No recent cardiac imaging     Physical Examination Review of Systems   Vitals:    12/11/19 1224   BP: 110/70   Pulse: 64   Resp: 16     Body mass index is 26.09 kg/m .  Wt Readings from Last 3 Encounters:   12/11/19 152 lb (68.9 kg)   12/12/18 150 lb (68 kg)   12/14/17 159 lb (72.1 kg)     General Appearance:   Awake, Alert, No acute distress.   HEENT:  No scleral icterus; the mucous membranes were pink and moist.   Neck: No cervical bruits or jugular venous distention    Chest: The spine was straight. The chest was symmetric.   Lungs:   Respirations unlabored; the lungs are clear to auscultation. No wheezing   Cardiovascular:    Slightly irregular rhythm.  S1, S2 normal.  1/6 systolic ejection murmur heard of the left upper sternal border.   Abdomen:  No organomegaly, masses, bruits, or tenderness. Bowels sounds are present   Extremities:  No peripheral edema bilaterally.   Skin: No xanthelasma. Warm, Dry.   Musculoskeletal: No tenderness.   Neurologic: Mood and affect are appropriate.    General: WNL  Eyes: WNL  Ears/Nose/Throat: WNL  Lungs: WNL  Heart: WNL  Stomach: WNL  Bladder: WNL  Muscle/Joints: WNL  Skin: WNL  Nervous System: WNL  Mental Health: WNL     Blood: WNL     Medical History  Surgical History Family History Social History   Past Medical History:   Diagnosis Date   ? Atrial fibrillation (H)    ? Valvular disease 2016    Severe aortic valve stenosis,Moderate mitral and  tricuspid regurgitation    Past Surgical History:   Procedure Laterality Date   ? CARDIAC VALVE REPLACEMENT  10/2016    TAVR    No family history on file. Social History     Socioeconomic History   ? Marital status:      Spouse name: Not on file   ? Number of children: Not on file   ? Years of education: Not on file   ? Highest education level: Not on file   Occupational History   ? Not on file   Social Needs   ? Financial resource strain: Not on file   ? Food insecurity:     Worry: Not on file     Inability: Not on file   ? Transportation needs:     Medical: Not on file     Non-medical: Not on file   Tobacco Use   ? Smoking status: Never Smoker   ? Smokeless tobacco: Never Used   Substance and Sexual Activity   ? Alcohol use: Not on file   ? Drug use: Not on file   ? Sexual activity: Not on file   Lifestyle   ? Physical activity:     Days per week: Not on file     Minutes per session: Not on file   ? Stress: Not on file   Relationships   ? Social connections:     Talks on phone: Not on file     Gets together: Not on file     Attends Faith service: Not on file     Active member of club or organization: Not on file     Attends meetings of clubs or organizations: Not on file     Relationship status: Not on file   ? Intimate partner violence:     Fear of current or ex partner: Not on file     Emotionally abused: Not on file     Physically abused: Not on file     Forced sexual activity: Not on file   Other Topics Concern   ? Not on file   Social History Narrative   ? Not on file          Medications  Allergies   Current Outpatient Medications   Medication Sig Dispense Refill   ? aspirin 81 MG EC tablet Take 81 mg by mouth daily.     ? furosemide (LASIX) 40 MG tablet Take 1.5 tablets (60 mg total) by mouth daily. 135 tablet 3   ? Lactobacillus rhamnosus GG (CULTURELLE) 10-15 Billion cell capsule Take 1 capsule by mouth daily.     ? levothyroxine (SYNTHROID, LEVOTHROID) 150 MCG tablet Take 150 mcg by mouth  daily.     ? losartan (COZAAR) 100 MG tablet TAKE 1 TABLET (100 MG      TOTAL) DAILY 90 tablet 0   ? magnesium oxide (MAG-OX) 400 mg tablet Take 400 mg by mouth daily.     ? melatonin 3 mg Tab tablet Take 5 mg by mouth bedtime as needed.     ? metoprolol succinate (TOPROL-XL) 50 MG 24 hr tablet Take 1 tablet (50 mg total) by mouth daily. 90 tablet 3   ? naproxen sodium (ALEVE) 220 MG tablet Take 220 mg by mouth as needed for pain.     ? potassium chloride (MICRO-K) 10 mEq CR capsule Take 10 mEq by mouth daily.      ? simvastatin (ZOCOR) 40 MG tablet Take 1 tablet (40 mg total) by mouth at bedtime. 90 tablet 3   ? warfarin (COUMADIN/JANTOVEN) 2 MG tablet Take 1 tablet (2 mg total) by mouth daily Take 1 tablet (2 mg) by mouth daily. Adjust dose based on INR results as directed. . 90 tablet 3   ? calcium carbonate (OS-JIGAR) 600 mg (1,500 mg) tablet Take 600 mg by mouth 2 (two) times a day with meals.     ? diphenhydrAMINE (BENADRYL) 25 mg capsule Take 25 mg by mouth daily.     ? multivitamin with minerals (THERA-M) 9 mg iron-400 mcg Tab tablet Take 1 tablet by mouth daily.       No current facility-administered medications for this visit.       Allergies   Allergen Reactions   ? Cardizem [Diltiazem Hcl]      Stomach upset   ? Excedrin Extra Strength [Aspirin-Acetaminophen-Caffeine]      ulcer   ? Gabapentin    ? Hydrocodone-Acetaminophen      stomach upset   ? Sulfa (Sulfonamide Antibiotics)    ? Trees          Lab Results    Chemistry/lipid CBC Cardiac Enzymes/BNP/TSH/INR   Lab Results   Component Value Date    CHOL 121 05/01/2019    HDL 38 (L) 05/01/2019    LDLCALC 49 05/01/2019    TRIG 171 (H) 05/01/2019    CREATININE 1.23 (H) 05/01/2019    BUN 26 05/01/2019    K 4.5 05/01/2019     05/01/2019     05/01/2019    CO2 29 05/01/2019    No results found for: WBC, HGB, HCT, MCV, PLT Lab Results   Component Value Date    TSH 0.04 (L) 05/01/2019    INR 2.03 (H) 06/26/2009

## 2021-06-30 NOTE — PROGRESS NOTES
Progress Notes by Renetta Sosa MD at 3/22/2021  8:50 AM     Author: Renetta Sosa MD Service: -- Author Type: Physician    Filed: 3/22/2021  9:27 AM Encounter Date: 3/22/2021 Status: Signed    : Renetta Sosa MD (Physician)           Thank you, Dr. Guaman, for asking the [facility] Heart Care team to see Ms. Ayanna Argueta to follow-up on transcutaneous aortic valve replacement and sick sinus syndrome.    Assessment/Recommendations   Assessment:    1.  Sick sinus syndrome, status post permanent pacemaker insertion now in underlying atrial fibrillation.  Pacemaker check today demonstrates normal device function with no ventricular arrhythmias over the past 10 months.  Because of blunted heart rate response to activity, her accelerator reaction time was decreased from 30 seconds to 20 seconds.  This should help with her symptoms of exertional dyspnea.  Lead and battery status normal.  2.  Aortic valve stenosis, status post transcatheter aortic valve replacement at the [facility] in 2016.  Echocardiogram 1 year ago demonstrated normal prosthetic valve function without aortic insufficiency.  We will plan echocardiogram at her next visit.  3.  Nonobstructive coronary artery disease.  Her catheterization report from [facility] in 2016 documented 20% stenoses in all 3 coronary arteries.  Denies any exertional chest discomfort.  4.  Dry mouth.  This may be related to her diuretic therapy.  Given the fact that she has no peripheral edema, could consider decreasing her furosemide to 40 mg daily.      Plan:  1.  Continue current medications although could try decreasing furosemide dose to see if this helps with dry mouth and dry eyes  2.  Follow-up in 1 year with echocardiogram prior to the visit.       History of Present Illness    Ms. Ayanna Argueta is a 89 y.o. female with history of aortic valve stenosis, status post transcatheter aortic valve replacement in 2016 at the [facility], mild coronary artery  disease by preprocedure angiography, sick sinus syndrome status post permanent pacemaker insertion now with permanent atrial fibrillation who presents today for her yearly visit.  She has continued to do well over the past year.  Does have some mild exertional dyspnea when climbing steps but no exertional chest discomfort.  Denies orthopnea, PND or lower extremity edema.  Also denies palpitations or lightheadedness.    ECG (personally reviewed): No ECG today.  Device check was performed in conjunction with her visit.  Underlying rhythm is atrial fibrillation with ventricular sensing and pacing.  She paces the right ventricle 89% of the time.  No ventricular arrhythmias.  Lead and battery status stable.    Cardiac Imaging Studies (personally reviewed): No recent cardiac imaging     Physical Examination Review of Systems   Vitals:    03/22/21 0815   BP: 104/64   Pulse: 60   Resp: 16     Body mass index is 25.92 kg/m .  Wt Readings from Last 3 Encounters:   03/22/21 151 lb (68.5 kg)   12/30/19 152 lb (68.9 kg)   12/11/19 152 lb (68.9 kg)     General Appearance:   Awake, Alert, No acute distress.   HEENT:  No scleral icterus; the mucous membranes were pink and moist.   Neck: No cervical bruits or jugular venous distention    Chest: The spine was straight. The chest was symmetric.   Lungs:   Respirations unlabored; the lungs are clear to auscultation. No wheezing   Cardiovascular:    Regular rate and rhythm.  S1, S2 normal.  No murmur or gallop   Abdomen:  No organomegaly, masses, bruits, or tenderness. Bowels sounds are present   Extremities:  Multiple venous varicosities noted over the lower extremities.  No edema.   Skin: No xanthelasma. Warm, Dry.   Musculoskeletal: No tenderness.   Neurologic: Mood and affect are appropriate.    General: WNL  Eyes: Visual Distubance  Ears/Nose/Throat: Hearing Loss  Lungs: Shortness of Breath  Heart: Shortness of Breath with activity  Stomach: WNL  Bladder: WNL  Muscle/Joints:  WNL  Skin: WNL  Nervous System: WNL  Mental Health: WNL     Blood: WNL     Medical History  Surgical History Family History Social History   Past Medical History:   Diagnosis Date   ? Atrial fibrillation (H)    ? Valvular disease 2016    Severe aortic valve stenosis,Moderate mitral and tricuspid regurgitation    Past Surgical History:   Procedure Laterality Date   ? CARDIAC VALVE REPLACEMENT  10/2016    TAVR    No family history on file. Social History     Socioeconomic History   ? Marital status:      Spouse name: Not on file   ? Number of children: Not on file   ? Years of education: Not on file   ? Highest education level: Not on file   Occupational History   ? Not on file   Social Needs   ? Financial resource strain: Not on file   ? Food insecurity     Worry: Not on file     Inability: Not on file   ? Transportation needs     Medical: Not on file     Non-medical: Not on file   Tobacco Use   ? Smoking status: Never Smoker   ? Smokeless tobacco: Never Used   Substance and Sexual Activity   ? Alcohol use: Not on file   ? Drug use: Not on file   ? Sexual activity: Not on file   Lifestyle   ? Physical activity     Days per week: Not on file     Minutes per session: Not on file   ? Stress: Not on file   Relationships   ? Social connections     Talks on phone: Not on file     Gets together: Not on file     Attends Worship service: Not on file     Active member of club or organization: Not on file     Attends meetings of clubs or organizations: Not on file     Relationship status: Not on file   ? Intimate partner violence     Fear of current or ex partner: Not on file     Emotionally abused: Not on file     Physically abused: Not on file     Forced sexual activity: Not on file   Other Topics Concern   ? Not on file   Social History Narrative   ? Not on file          Medications  Allergies   Current Outpatient Medications   Medication Sig Dispense Refill   ? amoxicillin (AMOXIL) 500 MG capsule TK FOUR CS PO 1  HOUR BEFORE ANY DENTAL PROCEDURE     ? aspirin 81 MG EC tablet Take 81 mg by mouth daily.     ? calcium carbonate (OS-JIGAR) 600 mg (1,500 mg) tablet Take 600 mg by mouth 2 (two) times a day with meals.     ? furosemide (LASIX) 40 MG tablet Take 1.5 tablets (60 mg total) by mouth daily. 135 tablet 3   ? losartan (COZAAR) 100 MG tablet TAKE 1 TABLET (100 MG      TOTAL) DAILY 90 tablet 3   ? magnesium oxide (MAG-OX) 400 mg tablet Take 400 mg by mouth daily.     ? metoprolol succinate (TOPROL-XL) 50 MG 24 hr tablet Take 1 tablet (50 mg total) by mouth daily. 90 tablet 3   ? naproxen sodium (ALEVE) 220 MG tablet Take 220 mg by mouth as needed for pain.     ? potassium chloride (MICRO-K) 10 mEq CR capsule Take 10 mEq by mouth daily.      ? simvastatin (ZOCOR) 40 MG tablet Take 1 tablet (40 mg total) by mouth at bedtime. 90 tablet 3   ? SYNTHROID 125 mcg tablet Take 1 tablet by mouth daily.     ? warfarin ANTICOAGULANT (COUMADIN/JANTOVEN) 2 MG tablet Take 1 tablet (2 mg total) by mouth daily. Take 1 tablet (2 mg) by mouth daily. Adjust dose based on INR results as directed. 90 tablet 3   ? diphenhydrAMINE (BENADRYL) 25 mg capsule Take 25 mg by mouth daily.     ? Lactobacillus rhamnosus GG (CULTURELLE) 10-15 Billion cell capsule Take 1 capsule by mouth daily.     ? levothyroxine (SYNTHROID, LEVOTHROID) 150 MCG tablet Take 150 mcg by mouth daily.     ? melatonin 3 mg Tab tablet Take 5 mg by mouth bedtime as needed.     ? multivitamin with minerals (THERA-M) 9 mg iron-400 mcg Tab tablet Take 1 tablet by mouth daily.       No current facility-administered medications for this visit.       Allergies   Allergen Reactions   ? Cardizem [Diltiazem Hcl]      Stomach upset   ? Excedrin Extra Strength [Aspirin-Acetaminophen-Caffeine]      ulcer   ? Gabapentin    ? Hydrocodone-Acetaminophen      stomach upset   ? Sulfa (Sulfonamide Antibiotics)    ? Trees          Lab Results    Chemistry/lipid CBC Cardiac Enzymes/BNP/TSH/INR   Lab  Results   Component Value Date    CHOL 137 06/02/2020    HDL 44 (L) 06/02/2020    LDLCALC 57 06/02/2020    TRIG 179 (H) 06/02/2020    CREATININE 1.23 (H) 06/02/2020    BUN 15 06/02/2020    K 4.3 06/02/2020     06/02/2020     06/02/2020    CO2 29 06/02/2020    No results found for: WBC, HGB, HCT, MCV, PLT Lab Results   Component Value Date    TSH 0.07 (L) 06/02/2020    INR 2.03 (H) 06/26/2009        A total of 35 minutes was spent reviewing patient's medical records, obtaining history and performing examination, as well as discussing diagnoses/ recommendations with patient and answering all questions.

## 2021-07-03 NOTE — ADDENDUM NOTE
Addendum Note by Dariel Guerra MD at 12/11/2019 12:50 PM     Author: Dariel Guerra MD Service: -- Author Type: Physician    Filed: 12/11/2019  1:51 PM Encounter Date: 12/11/2019 Status: Signed    : Dariel Guerra MD (Physician)    Addended by: DARIEL GUERRA on: 12/11/2019 01:51 PM        Modules accepted: Orders

## 2021-10-04 ENCOUNTER — ANCILLARY PROCEDURE (OUTPATIENT)
Dept: CARDIOLOGY | Facility: CLINIC | Age: 86
End: 2021-10-04
Attending: INTERNAL MEDICINE
Payer: COMMERCIAL

## 2021-10-04 DIAGNOSIS — Z95.0 CARDIAC PACEMAKER IN SITU: ICD-10-CM

## 2021-10-04 DIAGNOSIS — I49.5 SSS (SICK SINUS SYNDROME) (H): Primary | ICD-10-CM

## 2021-10-04 LAB
MDC_IDC_EPISODE_DTM: NORMAL
MDC_IDC_EPISODE_DTM: NORMAL
MDC_IDC_EPISODE_ID: NORMAL
MDC_IDC_EPISODE_ID: NORMAL
MDC_IDC_EPISODE_TYPE: NORMAL
MDC_IDC_EPISODE_TYPE: NORMAL
MDC_IDC_LEAD_IMPLANT_DT: NORMAL
MDC_IDC_LEAD_LOCATION: NORMAL
MDC_IDC_LEAD_LOCATION_DETAIL_1: NORMAL
MDC_IDC_LEAD_MFG: NORMAL
MDC_IDC_LEAD_MODEL: NORMAL
MDC_IDC_LEAD_POLARITY_TYPE: NORMAL
MDC_IDC_LEAD_SERIAL: NORMAL
MDC_IDC_LEAD_SPECIAL_FUNCTION: NORMAL
MDC_IDC_MSMT_BATTERY_DTM: NORMAL
MDC_IDC_MSMT_BATTERY_REMAINING_LONGEVITY: 78 MO
MDC_IDC_MSMT_BATTERY_REMAINING_PERCENTAGE: 96 %
MDC_IDC_MSMT_BATTERY_STATUS: NORMAL
MDC_IDC_MSMT_LEADCHNL_RV_IMPEDANCE_VALUE: 532 OHM
MDC_IDC_MSMT_LEADCHNL_RV_PACING_THRESHOLD_AMPLITUDE: 0.7 V
MDC_IDC_MSMT_LEADCHNL_RV_PACING_THRESHOLD_PULSEWIDTH: 0.4 MS
MDC_IDC_PG_IMPLANT_DTM: NORMAL
MDC_IDC_PG_MFG: NORMAL
MDC_IDC_PG_MODEL: NORMAL
MDC_IDC_PG_SERIAL: NORMAL
MDC_IDC_PG_TYPE: NORMAL
MDC_IDC_SESS_CLINIC_NAME: NORMAL
MDC_IDC_SESS_DTM: NORMAL
MDC_IDC_SESS_TYPE: NORMAL
MDC_IDC_SET_BRADY_LOWRATE: 60 {BEATS}/MIN
MDC_IDC_SET_BRADY_MAX_SENSOR_RATE: 120 {BEATS}/MIN
MDC_IDC_SET_BRADY_MODE: NORMAL
MDC_IDC_SET_LEADCHNL_RV_PACING_AMPLITUDE: 1.5 V
MDC_IDC_SET_LEADCHNL_RV_PACING_CAPTURE_MODE: NORMAL
MDC_IDC_SET_LEADCHNL_RV_PACING_POLARITY: NORMAL
MDC_IDC_SET_LEADCHNL_RV_PACING_PULSEWIDTH: 0.4 MS
MDC_IDC_SET_LEADCHNL_RV_SENSING_ADAPTATION_MODE: NORMAL
MDC_IDC_SET_LEADCHNL_RV_SENSING_POLARITY: NORMAL
MDC_IDC_SET_LEADCHNL_RV_SENSING_SENSITIVITY: 2.5 MV
MDC_IDC_SET_ZONE_DETECTION_INTERVAL: 375 MS
MDC_IDC_SET_ZONE_TYPE: NORMAL
MDC_IDC_SET_ZONE_VENDOR_TYPE: NORMAL
MDC_IDC_STAT_BRADY_DTM_END: NORMAL
MDC_IDC_STAT_BRADY_DTM_START: NORMAL
MDC_IDC_STAT_BRADY_RV_PERCENT_PACED: 92 %
MDC_IDC_STAT_EPISODE_RECENT_COUNT: 0
MDC_IDC_STAT_EPISODE_RECENT_COUNT: 0
MDC_IDC_STAT_EPISODE_RECENT_COUNT_DTM_END: NORMAL
MDC_IDC_STAT_EPISODE_RECENT_COUNT_DTM_END: NORMAL
MDC_IDC_STAT_EPISODE_RECENT_COUNT_DTM_START: NORMAL
MDC_IDC_STAT_EPISODE_RECENT_COUNT_DTM_START: NORMAL
MDC_IDC_STAT_EPISODE_TYPE: NORMAL
MDC_IDC_STAT_EPISODE_TYPE: NORMAL
MDC_IDC_STAT_EPISODE_VENDOR_TYPE: NORMAL
MDC_IDC_STAT_EPISODE_VENDOR_TYPE: NORMAL

## 2021-10-04 PROCEDURE — 93294 REM INTERROG EVL PM/LDLS PM: CPT | Performed by: INTERNAL MEDICINE

## 2021-10-04 PROCEDURE — 93296 REM INTERROG EVL PM/IDS: CPT | Performed by: INTERNAL MEDICINE

## 2021-11-17 ENCOUNTER — LAB REQUISITION (OUTPATIENT)
Dept: LAB | Facility: CLINIC | Age: 86
End: 2021-11-17
Payer: COMMERCIAL

## 2021-11-17 DIAGNOSIS — E78.5 HYPERLIPIDEMIA, UNSPECIFIED: ICD-10-CM

## 2021-11-17 DIAGNOSIS — G62.9 POLYNEUROPATHY, UNSPECIFIED: ICD-10-CM

## 2021-11-17 DIAGNOSIS — E03.9 HYPOTHYROIDISM, UNSPECIFIED: ICD-10-CM

## 2021-11-17 DIAGNOSIS — E83.42 HYPOMAGNESEMIA: ICD-10-CM

## 2021-11-17 LAB
ALBUMIN SERPL-MCNC: 3.9 G/DL (ref 3.5–5)
ALP SERPL-CCNC: 64 U/L (ref 45–120)
ALT SERPL W P-5'-P-CCNC: 22 U/L (ref 0–45)
ANION GAP SERPL CALCULATED.3IONS-SCNC: 13 MMOL/L (ref 5–18)
AST SERPL W P-5'-P-CCNC: 22 U/L (ref 0–40)
BILIRUB SERPL-MCNC: 2.8 MG/DL (ref 0–1)
BUN SERPL-MCNC: 19 MG/DL (ref 8–28)
CALCIUM SERPL-MCNC: 10.4 MG/DL (ref 8.5–10.5)
CHLORIDE BLD-SCNC: 102 MMOL/L (ref 98–107)
CHOLEST SERPL-MCNC: 129 MG/DL
CO2 SERPL-SCNC: 29 MMOL/L (ref 22–31)
CREAT SERPL-MCNC: 1.36 MG/DL (ref 0.6–1.1)
GFR SERPL CREATININE-BSD FRML MDRD: 34 ML/MIN/1.73M2
GLUCOSE BLD-MCNC: 91 MG/DL (ref 70–125)
HDLC SERPL-MCNC: 42 MG/DL
LDLC SERPL CALC-MCNC: 53 MG/DL
MAGNESIUM SERPL-MCNC: 2.3 MG/DL (ref 1.8–2.6)
POTASSIUM BLD-SCNC: 4.2 MMOL/L (ref 3.5–5)
PROT SERPL-MCNC: 7.2 G/DL (ref 6–8)
SODIUM SERPL-SCNC: 144 MMOL/L (ref 136–145)
TRIGL SERPL-MCNC: 171 MG/DL
TSH SERPL DL<=0.005 MIU/L-ACNC: 0.96 UIU/ML (ref 0.3–5)
VIT B12 SERPL-MCNC: 537 PG/ML (ref 213–816)

## 2021-11-17 PROCEDURE — 83735 ASSAY OF MAGNESIUM: CPT | Mod: ORL | Performed by: NURSE PRACTITIONER

## 2021-11-17 PROCEDURE — 84443 ASSAY THYROID STIM HORMONE: CPT | Mod: ORL | Performed by: NURSE PRACTITIONER

## 2021-11-17 PROCEDURE — 86618 LYME DISEASE ANTIBODY: CPT | Mod: ORL | Performed by: NURSE PRACTITIONER

## 2021-11-17 PROCEDURE — 80053 COMPREHEN METABOLIC PANEL: CPT | Mod: ORL | Performed by: NURSE PRACTITIONER

## 2021-11-17 PROCEDURE — 82607 VITAMIN B-12: CPT | Mod: ORL | Performed by: NURSE PRACTITIONER

## 2021-11-17 PROCEDURE — 80061 LIPID PANEL: CPT | Mod: ORL | Performed by: NURSE PRACTITIONER

## 2021-11-18 LAB — B BURGDOR IGG+IGM SER QL: 0.3

## 2022-01-06 NOTE — PROGRESS NOTES
Phenergan with codeine no longer carried by pharmacy.   They have Robitussin AC in stock, bur will need new Rx Remote device check.  Please see link for full device report.

## 2022-01-13 ENCOUNTER — ANCILLARY PROCEDURE (OUTPATIENT)
Dept: CARDIOLOGY | Facility: CLINIC | Age: 87
End: 2022-01-13
Attending: INTERNAL MEDICINE
Payer: COMMERCIAL

## 2022-01-13 DIAGNOSIS — Z95.0 CARDIAC PACEMAKER IN SITU: ICD-10-CM

## 2022-01-13 DIAGNOSIS — I49.5 SICK SINUS SYNDROME (H): ICD-10-CM

## 2022-01-13 LAB
MDC_IDC_EPISODE_DTM: NORMAL
MDC_IDC_EPISODE_DTM: NORMAL
MDC_IDC_EPISODE_ID: NORMAL
MDC_IDC_EPISODE_ID: NORMAL
MDC_IDC_EPISODE_TYPE: NORMAL
MDC_IDC_EPISODE_TYPE: NORMAL
MDC_IDC_LEAD_IMPLANT_DT: NORMAL
MDC_IDC_LEAD_LOCATION: NORMAL
MDC_IDC_LEAD_LOCATION_DETAIL_1: NORMAL
MDC_IDC_LEAD_MFG: NORMAL
MDC_IDC_LEAD_MODEL: NORMAL
MDC_IDC_LEAD_POLARITY_TYPE: NORMAL
MDC_IDC_LEAD_SERIAL: NORMAL
MDC_IDC_LEAD_SPECIAL_FUNCTION: NORMAL
MDC_IDC_MSMT_BATTERY_DTM: NORMAL
MDC_IDC_MSMT_BATTERY_REMAINING_LONGEVITY: 78 MO
MDC_IDC_MSMT_BATTERY_REMAINING_PERCENTAGE: 93 %
MDC_IDC_MSMT_BATTERY_STATUS: NORMAL
MDC_IDC_MSMT_LEADCHNL_RV_IMPEDANCE_VALUE: 524 OHM
MDC_IDC_MSMT_LEADCHNL_RV_PACING_THRESHOLD_AMPLITUDE: 0.6 V
MDC_IDC_MSMT_LEADCHNL_RV_PACING_THRESHOLD_PULSEWIDTH: 0.4 MS
MDC_IDC_PG_IMPLANT_DTM: NORMAL
MDC_IDC_PG_MFG: NORMAL
MDC_IDC_PG_MODEL: NORMAL
MDC_IDC_PG_SERIAL: NORMAL
MDC_IDC_PG_TYPE: NORMAL
MDC_IDC_SESS_CLINIC_NAME: NORMAL
MDC_IDC_SESS_DTM: NORMAL
MDC_IDC_SESS_TYPE: NORMAL
MDC_IDC_SET_BRADY_LOWRATE: 60 {BEATS}/MIN
MDC_IDC_SET_BRADY_MAX_SENSOR_RATE: 120 {BEATS}/MIN
MDC_IDC_SET_BRADY_MODE: NORMAL
MDC_IDC_SET_LEADCHNL_RV_PACING_AMPLITUDE: 1.5 V
MDC_IDC_SET_LEADCHNL_RV_PACING_CAPTURE_MODE: NORMAL
MDC_IDC_SET_LEADCHNL_RV_PACING_POLARITY: NORMAL
MDC_IDC_SET_LEADCHNL_RV_PACING_PULSEWIDTH: 0.4 MS
MDC_IDC_SET_LEADCHNL_RV_SENSING_ADAPTATION_MODE: NORMAL
MDC_IDC_SET_LEADCHNL_RV_SENSING_POLARITY: NORMAL
MDC_IDC_SET_LEADCHNL_RV_SENSING_SENSITIVITY: 2.5 MV
MDC_IDC_SET_ZONE_DETECTION_INTERVAL: 375 MS
MDC_IDC_SET_ZONE_TYPE: NORMAL
MDC_IDC_SET_ZONE_VENDOR_TYPE: NORMAL
MDC_IDC_STAT_BRADY_DTM_END: NORMAL
MDC_IDC_STAT_BRADY_DTM_START: NORMAL
MDC_IDC_STAT_BRADY_RV_PERCENT_PACED: 93 %
MDC_IDC_STAT_EPISODE_RECENT_COUNT: 0
MDC_IDC_STAT_EPISODE_RECENT_COUNT: 0
MDC_IDC_STAT_EPISODE_RECENT_COUNT_DTM_END: NORMAL
MDC_IDC_STAT_EPISODE_RECENT_COUNT_DTM_END: NORMAL
MDC_IDC_STAT_EPISODE_RECENT_COUNT_DTM_START: NORMAL
MDC_IDC_STAT_EPISODE_RECENT_COUNT_DTM_START: NORMAL
MDC_IDC_STAT_EPISODE_TYPE: NORMAL
MDC_IDC_STAT_EPISODE_TYPE: NORMAL
MDC_IDC_STAT_EPISODE_VENDOR_TYPE: NORMAL
MDC_IDC_STAT_EPISODE_VENDOR_TYPE: NORMAL

## 2022-01-13 PROCEDURE — 93296 REM INTERROG EVL PM/IDS: CPT | Mod: 59 | Performed by: INTERNAL MEDICINE

## 2022-01-13 PROCEDURE — 93294 REM INTERROG EVL PM/LDLS PM: CPT | Performed by: INTERNAL MEDICINE

## 2022-02-10 ENCOUNTER — TELEPHONE (OUTPATIENT)
Dept: CARDIOLOGY | Facility: CLINIC | Age: 87
End: 2022-02-10
Payer: COMMERCIAL

## 2022-02-10 DIAGNOSIS — I48.21 PERMANENT ATRIAL FIBRILLATION (H): ICD-10-CM

## 2022-02-10 DIAGNOSIS — Z95.0 CARDIAC PACEMAKER IN SITU: Primary | ICD-10-CM

## 2022-02-10 DIAGNOSIS — Z95.2 S/P TAVR (TRANSCATHETER AORTIC VALVE REPLACEMENT): ICD-10-CM

## 2022-02-10 NOTE — TELEPHONE ENCOUNTER
----- Message from Alona Jean sent at 2/10/2022  9:50 AM CST -----  General phone call:    Caller: Pt  Primary cardiologist: KAREEN  Detailed reason for call: Can you put a new up to date Echo. PT cancelled all her appt,because she is in [place] and will be back in May. I printed her info out and will call her back when May is released,but the order will .  Thank you,  Alona  New or active symptoms?n/a  Best phone number:   Best time to contact:   Ok to leave a detailedmessage?   Device? yes    Additional Info:

## 2022-02-10 NOTE — TELEPHONE ENCOUNTER
"Per Dr. Sosa's 3-22-21 visit note:  \"Follow-up in 1 year with echocardiogram prior to the visit.\"    New order placed per protocol - return msg sent to sched with update.  mg     "

## 2022-04-27 ENCOUNTER — LAB REQUISITION (OUTPATIENT)
Dept: LAB | Facility: CLINIC | Age: 87
End: 2022-04-27
Payer: COMMERCIAL

## 2022-04-27 DIAGNOSIS — I10 ESSENTIAL (PRIMARY) HYPERTENSION: ICD-10-CM

## 2022-04-27 LAB
ANION GAP SERPL CALCULATED.3IONS-SCNC: 16 MMOL/L (ref 5–18)
BUN SERPL-MCNC: 21 MG/DL (ref 8–28)
CALCIUM SERPL-MCNC: 10 MG/DL (ref 8.5–10.5)
CHLORIDE BLD-SCNC: 99 MMOL/L (ref 98–107)
CO2 SERPL-SCNC: 29 MMOL/L (ref 22–31)
CREAT SERPL-MCNC: 1.3 MG/DL (ref 0.6–1.1)
GFR SERPL CREATININE-BSD FRML MDRD: 39 ML/MIN/1.73M2
GLUCOSE BLD-MCNC: 95 MG/DL (ref 70–125)
POTASSIUM BLD-SCNC: 4 MMOL/L (ref 3.5–5)
SODIUM SERPL-SCNC: 144 MMOL/L (ref 136–145)

## 2022-04-27 PROCEDURE — 80048 BASIC METABOLIC PNL TOTAL CA: CPT | Mod: ORL | Performed by: NURSE PRACTITIONER

## 2022-05-03 ENCOUNTER — HOSPITAL ENCOUNTER (OUTPATIENT)
Dept: CARDIOLOGY | Facility: CLINIC | Age: 87
Discharge: HOME | End: 2022-05-03
Attending: INTERNAL MEDICINE | Admitting: INTERNAL MEDICINE
Payer: COMMERCIAL

## 2022-05-03 DIAGNOSIS — Z95.0 CARDIAC PACEMAKER IN SITU: ICD-10-CM

## 2022-05-03 DIAGNOSIS — Z95.2 S/P TAVR (TRANSCATHETER AORTIC VALVE REPLACEMENT): ICD-10-CM

## 2022-05-03 DIAGNOSIS — I48.21 PERMANENT ATRIAL FIBRILLATION (H): ICD-10-CM

## 2022-05-03 LAB — LVEF ECHO: NORMAL

## 2022-05-03 PROCEDURE — 93306 TTE W/DOPPLER COMPLETE: CPT | Mod: 26 | Performed by: INTERNAL MEDICINE

## 2022-05-03 PROCEDURE — 93306 TTE W/DOPPLER COMPLETE: CPT

## 2022-05-17 ENCOUNTER — OFFICE VISIT (OUTPATIENT)
Dept: CARDIOLOGY | Facility: CLINIC | Age: 87
End: 2022-05-17
Attending: INTERNAL MEDICINE

## 2022-05-17 ENCOUNTER — ANCILLARY PROCEDURE (OUTPATIENT)
Dept: CARDIOLOGY | Facility: CLINIC | Age: 87
End: 2022-05-17
Attending: INTERNAL MEDICINE
Payer: COMMERCIAL

## 2022-05-17 VITALS
DIASTOLIC BLOOD PRESSURE: 62 MMHG | SYSTOLIC BLOOD PRESSURE: 102 MMHG | BODY MASS INDEX: 24.89 KG/M2 | HEART RATE: 68 BPM | RESPIRATION RATE: 16 BRPM | WEIGHT: 145 LBS

## 2022-05-17 DIAGNOSIS — I49.5 SICK SINUS SYNDROME (H): Primary | ICD-10-CM

## 2022-05-17 DIAGNOSIS — R00.1 BRADYCARDIA: Primary | ICD-10-CM

## 2022-05-17 DIAGNOSIS — Z95.0 CARDIAC PACEMAKER IN SITU: ICD-10-CM

## 2022-05-17 DIAGNOSIS — Z95.0 PACEMAKER: ICD-10-CM

## 2022-05-17 DIAGNOSIS — I48.21 PERMANENT ATRIAL FIBRILLATION (H): ICD-10-CM

## 2022-05-17 DIAGNOSIS — Z95.3 S/P TAVR (TRANSCATHETER AORTIC VALVE REPLACEMENT), BIOPROSTHETIC: ICD-10-CM

## 2022-05-17 DIAGNOSIS — I25.10 CORONARY ARTERY DISEASE INVOLVING NATIVE CORONARY ARTERY OF NATIVE HEART WITHOUT ANGINA PECTORIS: ICD-10-CM

## 2022-05-17 PROCEDURE — 93279 PRGRMG DEV EVAL PM/LDLS PM: CPT | Performed by: INTERNAL MEDICINE

## 2022-05-17 PROCEDURE — 99214 OFFICE O/P EST MOD 30 MIN: CPT | Performed by: INTERNAL MEDICINE

## 2022-05-17 NOTE — PROGRESS NOTES
Thank you, Kristyn Guaman CNP for asking the [facility] team to see Ms. Ayanna Argueta to follow-up on sick sinus syndrome status post pacemaker and aortic valve disease.    Assessment/Recommendations   Assessment:    1.  Sick sinus syndrome, status post permanent pacemaker insertion now in permanent atrial fibrillation.  Pacemaker check performed today in clinic demonstrates normal device operation with no evidence of ventricular arrhythmias over the past year.  Heart rate distribution excellent following programming changes last year.  Lead and battery status stable.  She remains on warfarin anticoagulation to minimize risks of thromboembolic events.    2. Aortic valve stenosis, status post TAVR at the [facility] ER in 2016.  Recent echocardiogram demonstrates normal valve function..  3.  Nonobstructive coronary artery disease    Plan:  1.  Continue current medications  2.  Follow-up in 1 year or sooner if problems       History of Present Illness    Ms. Ayanna Argueta is a 89 year old female with history of sick sinus syndrome status post dual-chamber pacemaker insertion with subsequent development of permanent atrial fibrillation on warfarin anticoagulation and aortic valve stenosis status post TAVR who presents today for her yearly follow-up visit.  Has done well over the year from a cardiac standpoint but has developed worsening symptoms of peripheral neuropathy.  Reports no complaints of chest discomfort, exertional dyspnea, or lower extremity edema.  No palpitations or lightheadedness.  Will be celebrating her 89st birthday in 2 days.     ECG (personally reviewed): No ECG today.  Device check was performed in conjunction with her visit demonstrating normal device operation.  No ventricular arrhythmias.  Lead and battery status stable with 6 years longevity on her battery documented.    Cardiac Imaging Studies (personally reviewed):   Interpretation Summary     Left ventricular size, wall  motion and function are normal. The ejection  fraction is 55-60%.  There is mild concentric left ventricular hypertrophy.  Diastolic Doppler findings (E/E' ratio and/or other parameters) suggest left  ventricular filling pressures are increased.  Normal right ventricle size and systolic function.  There is a catheter/pacemaker lead seen in the right ventricle.  The left atrium is moderate to severely dilated.  No obvious valvular disease.       Physical Examination Review of Systems   /62 (BP Location: Left arm, Patient Position: Sitting, Cuff Size: Adult Regular)   Pulse 68   Resp 16   Wt 65.8 kg (145 lb)   BMI 24.89 kg/m    Body mass index is 24.89 kg/m .  Wt Readings from Last 3 Encounters:   05/17/22 65.8 kg (145 lb)   03/22/21 68.5 kg (151 lb)   12/11/19 68.9 kg (152 lb)     General Appearance:   Awake, Alert, No acute distress.   HEENT:  No scleral icterus; the mucous membranes were pink and moist.   Neck: No cervical bruits or jugular venous distention    Chest: The spine was straight. The chest was symmetric.   Lungs:   Respirations unlabored; the lungs are clear to auscultation. No wheezing   Cardiovascular:    Regular rate and rhythm.  S1, S2 normal.     Abdomen:  No organomegaly, masses, bruits, or tenderness. Bowels sounds are present   Extremities: No peripheral edema.  Multiple varicosities over both lower extremities.   Skin: No xanthelasma. Warm, Dry.   Musculoskeletal: No tenderness.   Neurologic: Mood and affect are appropriate.    Enc Vitals  BP: 102/62  Pulse: 68  Resp: 16  Weight: 65.8 kg (145 lb)                                         Medical History  Surgical History Family History Social History   -Mild nonobstructive CAD  -Severe aortic valve stenosis   -sick sinus syndrome  -Permanent atrial fibrillation Past Surgical History:   Procedure Laterality Date     CARDIAC VALVE REPLACEMENT  10/2016    TAVR     IR LUMBAR EPIDURAL STEROID INJECTION  10/19/2007    No family history on  file. Social History     Socioeconomic History     Marital status:      Spouse name: Not on file     Number of children: Not on file     Years of education: Not on file     Highest education level: Not on file   Occupational History     Not on file   Tobacco Use     Smoking status: Never Smoker     Smokeless tobacco: Never Used   Substance and Sexual Activity     Alcohol use: Not on file     Drug use: Not on file     Sexual activity: Not on file   Other Topics Concern     Not on file   Social History Narrative     Not on file     Social Determinants of Health     Financial Resource Strain: Not on file   Food Insecurity: Not on file   Transportation Needs: Not on file   Physical Activity: Not on file   Stress: Not on file   Social Connections: Not on file   Intimate Partner Violence: Not on file   Housing Stability: Not on file          Medications  Allergies   Current Outpatient Medications   Medication Sig Dispense Refill     amoxicillin (AMOXIL) 500 MG capsule 2000 mg before dental procedure       aspirin 81 MG EC tablet [ASPIRIN 81 MG EC TABLET] Take 81 mg by mouth daily.       calcium carbonate (OS-JIGAR) 600 mg (1,500 mg) tablet Take 600 mg by mouth daily       diphenhydrAMINE (BENADRYL) 25 mg capsule [DIPHENHYDRAMINE (BENADRYL) 25 MG CAPSULE] Take 25 mg by mouth daily.       furosemide (LASIX) 40 MG tablet [FUROSEMIDE (LASIX) 40 MG TABLET] Take 1.5 tablets (60 mg total) by mouth daily. 135 tablet 3     Lactobacillus rhamnosus GG (CULTURELLE) 10-15 Billion cell capsule [LACTOBACILLUS RHAMNOSUS GG (CULTURELLE) 10-15 BILLION CELL CAPSULE] Take 1 capsule by mouth daily.       losartan (COZAAR) 100 MG tablet [LOSARTAN (COZAAR) 100 MG TABLET] TAKE 1 TABLET (100 MG      TOTAL) DAILY 90 tablet 3     magnesium oxide (MAG-OX) 400 mg tablet [MAGNESIUM OXIDE (MAG-OX) 400 MG TABLET] Take 400 mg by mouth daily.       melatonin 3 mg Tab tablet Take 10 mg by mouth nightly as needed       metoprolol succinate (TOPROL-XL)  50 MG 24 hr tablet [METOPROLOL SUCCINATE (TOPROL-XL) 50 MG 24 HR TABLET] Take 1 tablet (50 mg total) by mouth daily. 90 tablet 3     multivitamin with minerals (THERA-M) 9 mg iron-400 mcg Tab tablet [MULTIVITAMIN WITH MINERALS (THERA-M) 9 MG IRON-400 MCG TAB TABLET] Take 1 tablet by mouth daily.       naproxen sodium (ALEVE) 220 MG tablet [NAPROXEN SODIUM (ALEVE) 220 MG TABLET] Take 220 mg by mouth as needed for pain.       potassium chloride (MICRO-K) 10 mEq CR capsule [POTASSIUM CHLORIDE (MICRO-K) 10 MEQ CR CAPSULE] Take 10 mEq by mouth daily.        simvastatin (ZOCOR) 40 MG tablet [SIMVASTATIN (ZOCOR) 40 MG TABLET] Take 1 tablet (40 mg total) by mouth at bedtime. 90 tablet 3     SYNTHROID 125 mcg tablet Take 112 mcg by mouth daily       warfarin ANTICOAGULANT (COUMADIN/JANTOVEN) 2 MG tablet [WARFARIN ANTICOAGULANT (COUMADIN/JANTOVEN) 2 MG TABLET] Take 1 tablet (2 mg total) by mouth daily. Take 1 tablet (2 mg) by mouth daily. Adjust dose based on INR results as directed. (Patient taking differently: Take 2 mg by mouth daily 1mg on Mondays, Weds, Fridays. The there days 2mg.) 90 tablet 3     levothyroxine (SYNTHROID, LEVOTHROID) 150 MCG tablet [LEVOTHYROXINE (SYNTHROID, LEVOTHROID) 150 MCG TABLET] Take 150 mcg by mouth daily. (Patient not taking: Reported on 5/17/2022)        Allergies   Allergen Reactions     Align      Other reaction(s): Dizzy     Cardizem [Diltiazem] Unknown     Stomach upset     Cephalexin      Other reaction(s): gas     Excedrin Extra Strength Unknown     ulcer     Gabapentin Unknown     Hydrocodone-Acetaminophen Unknown     stomach upset     Nitrofurantoin      Other reaction(s): gas     Sulfa (Sulfonamide Antibiotics) [Sulfa Drugs] Unknown     Trees Unknown         Lab Results    Chemistry/lipid CBC Cardiac Enzymes/BNP/TSH/INR   Recent Labs   Lab Test 04/27/22  1107 11/17/21  1025   TRIG  --  171*   LDL  --  53   BUN 21 19    144   CO2 29 29    No results for input(s): WBC, HGB, HCT,  MCV, PLT in the last 09393 hours. Recent Labs   Lab Test 11/17/21  1025   TSH 0.96        A total of 30 minutes was spent reviewing patient's medical records, obtaining history and performing examination, as well as discussing diagnoses/ recommendations with patient and answering all questions.

## 2022-05-17 NOTE — LETTER
5/17/2022    FENG Katz CNP  [facility] [address] [place] [address] [address] [place] [address]    RE: Ayanna Argueta       Dear Colleague,     I had the pleasure of seeing Ayanna Argueta in the [facility].      Thank you, Kristyn Guaman CNP for asking the [facility] team to see Ms. Ayanna Argueta to follow-up on sick sinus syndrome status post pacemaker and aortic valve disease.    Assessment/Recommendations   Assessment:    1.  Sick sinus syndrome, status post permanent pacemaker insertion now in permanent atrial fibrillation.  Pacemaker check performed today in clinic demonstrates normal device operation with no evidence of ventricular arrhythmias over the past year.  Heart rate distribution excellent following programming changes last year.  Lead and battery status stable.  She remains on warfarin anticoagulation to minimize risks of thromboembolic events.    2. Aortic valve stenosis, status post TAVR at the [facility] ER in 2016.  Recent echocardiogram demonstrates normal valve function..  3.  Nonobstructive coronary artery disease    Plan:  1.  Continue current medications  2.  Follow-up in 1 year or sooner if problems       History of Present Illness    Ms. Ayanna Argueta is a 89 year old female with history of sick sinus syndrome status post dual-chamber pacemaker insertion with subsequent development of permanent atrial fibrillation on warfarin anticoagulation and aortic valve stenosis status post TAVR who presents today for her yearly follow-up visit.  Has done well over the year from a cardiac standpoint but has developed worsening symptoms of peripheral neuropathy.  Reports no complaints of chest discomfort, exertional dyspnea, or lower extremity edema.  No palpitations or lightheadedness.  Will be celebrating her 89st birthday in 2 days.     ECG (personally reviewed): No ECG today.  Device check was performed in conjunction with her visit demonstrating normal device  operation.  No ventricular arrhythmias.  Lead and battery status stable with 6 years longevity on her battery documented.    Cardiac Imaging Studies (personally reviewed):   Interpretation Summary     Left ventricular size, wall motion and function are normal. The ejection  fraction is 55-60%.  There is mild concentric left ventricular hypertrophy.  Diastolic Doppler findings (E/E' ratio and/or other parameters) suggest left  ventricular filling pressures are increased.  Normal right ventricle size and systolic function.  There is a catheter/pacemaker lead seen in the right ventricle.  The left atrium is moderate to severely dilated.  No obvious valvular disease.       Physical Examination Review of Systems   /62 (BP Location: Left arm, Patient Position: Sitting, Cuff Size: Adult Regular)   Pulse 68   Resp 16   Wt 65.8 kg (145 lb)   BMI 24.89 kg/m    Body mass index is 24.89 kg/m .  Wt Readings from Last 3 Encounters:   05/17/22 65.8 kg (145 lb)   03/22/21 68.5 kg (151 lb)   12/11/19 68.9 kg (152 lb)     General Appearance:   Awake, Alert, No acute distress.   HEENT:  No scleral icterus; the mucous membranes were pink and moist.   Neck: No cervical bruits or jugular venous distention    Chest: The spine was straight. The chest was symmetric.   Lungs:   Respirations unlabored; the lungs are clear to auscultation. No wheezing   Cardiovascular:    Regular rate and rhythm.  S1, S2 normal.     Abdomen:  No organomegaly, masses, bruits, or tenderness. Bowels sounds are present   Extremities: No peripheral edema.  Multiple varicosities over both lower extremities.   Skin: No xanthelasma. Warm, Dry.   Musculoskeletal: No tenderness.   Neurologic: Mood and affect are appropriate.    Enc Vitals  BP: 102/62  Pulse: 68  Resp: 16  Weight: 65.8 kg (145 lb)                                         Medical History  Surgical History Family History Social History   -Mild nonobstructive CAD  -Severe aortic valve stenosis    -sick sinus syndrome  -Permanent atrial fibrillation Past Surgical History:   Procedure Laterality Date     CARDIAC VALVE REPLACEMENT  10/2016    TAVR     IR LUMBAR EPIDURAL STEROID INJECTION  10/19/2007    No family history on file. Social History     Socioeconomic History     Marital status:      Spouse name: Not on file     Number of children: Not on file     Years of education: Not on file     Highest education level: Not on file   Occupational History     Not on file   Tobacco Use     Smoking status: Never Smoker     Smokeless tobacco: Never Used   Substance and Sexual Activity     Alcohol use: Not on file     Drug use: Not on file     Sexual activity: Not on file   Other Topics Concern     Not on file   Social History Narrative     Not on file     Social Determinants of Health     Financial Resource Strain: Not on file   Food Insecurity: Not on file   Transportation Needs: Not on file   Physical Activity: Not on file   Stress: Not on file   Social Connections: Not on file   Intimate Partner Violence: Not on file   Housing Stability: Not on file          Medications  Allergies   Current Outpatient Medications   Medication Sig Dispense Refill     amoxicillin (AMOXIL) 500 MG capsule 2000 mg before dental procedure       aspirin 81 MG EC tablet [ASPIRIN 81 MG EC TABLET] Take 81 mg by mouth daily.       calcium carbonate (OS-JIGAR) 600 mg (1,500 mg) tablet Take 600 mg by mouth daily       diphenhydrAMINE (BENADRYL) 25 mg capsule [DIPHENHYDRAMINE (BENADRYL) 25 MG CAPSULE] Take 25 mg by mouth daily.       furosemide (LASIX) 40 MG tablet [FUROSEMIDE (LASIX) 40 MG TABLET] Take 1.5 tablets (60 mg total) by mouth daily. 135 tablet 3     Lactobacillus rhamnosus GG (CULTURELLE) 10-15 Billion cell capsule [LACTOBACILLUS RHAMNOSUS GG (CULTURELLE) 10-15 BILLION CELL CAPSULE] Take 1 capsule by mouth daily.       losartan (COZAAR) 100 MG tablet [LOSARTAN (COZAAR) 100 MG TABLET] TAKE 1 TABLET (100 MG      TOTAL) DAILY 90  tablet 3     magnesium oxide (MAG-OX) 400 mg tablet [MAGNESIUM OXIDE (MAG-OX) 400 MG TABLET] Take 400 mg by mouth daily.       melatonin 3 mg Tab tablet Take 10 mg by mouth nightly as needed       metoprolol succinate (TOPROL-XL) 50 MG 24 hr tablet [METOPROLOL SUCCINATE (TOPROL-XL) 50 MG 24 HR TABLET] Take 1 tablet (50 mg total) by mouth daily. 90 tablet 3     multivitamin with minerals (THERA-M) 9 mg iron-400 mcg Tab tablet [MULTIVITAMIN WITH MINERALS (THERA-M) 9 MG IRON-400 MCG TAB TABLET] Take 1 tablet by mouth daily.       naproxen sodium (ALEVE) 220 MG tablet [NAPROXEN SODIUM (ALEVE) 220 MG TABLET] Take 220 mg by mouth as needed for pain.       potassium chloride (MICRO-K) 10 mEq CR capsule [POTASSIUM CHLORIDE (MICRO-K) 10 MEQ CR CAPSULE] Take 10 mEq by mouth daily.        simvastatin (ZOCOR) 40 MG tablet [SIMVASTATIN (ZOCOR) 40 MG TABLET] Take 1 tablet (40 mg total) by mouth at bedtime. 90 tablet 3     SYNTHROID 125 mcg tablet Take 112 mcg by mouth daily       warfarin ANTICOAGULANT (COUMADIN/JANTOVEN) 2 MG tablet [WARFARIN ANTICOAGULANT (COUMADIN/JANTOVEN) 2 MG TABLET] Take 1 tablet (2 mg total) by mouth daily. Take 1 tablet (2 mg) by mouth daily. Adjust dose based on INR results as directed. (Patient taking differently: Take 2 mg by mouth daily 1mg on Mondays, Weds, Fridays. The there days 2mg.) 90 tablet 3     levothyroxine (SYNTHROID, LEVOTHROID) 150 MCG tablet [LEVOTHYROXINE (SYNTHROID, LEVOTHROID) 150 MCG TABLET] Take 150 mcg by mouth daily. (Patient not taking: Reported on 5/17/2022)        Allergies   Allergen Reactions     Align      Other reaction(s): Dizzy     Cardizem [Diltiazem] Unknown     Stomach upset     Cephalexin      Other reaction(s): gas     Excedrin Extra Strength Unknown     ulcer     Gabapentin Unknown     Hydrocodone-Acetaminophen Unknown     stomach upset     Nitrofurantoin      Other reaction(s): gas     Sulfa (Sulfonamide Antibiotics) [Sulfa Drugs] Unknown     Trees Unknown          Lab Results    Chemistry/lipid CBC Cardiac Enzymes/BNP/TSH/INR   Recent Labs   Lab Test 04/27/22  1107 11/17/21  1025   TRIG  --  171*   LDL  --  53   BUN 21 19    144   CO2 29 29    No results for input(s): WBC, HGB, HCT, MCV, PLT in the last 29112 hours. Recent Labs   Lab Test 11/17/21  1025   TSH 0.96        A total of 30 minutes was spent reviewing patient's medical records, obtaining history and performing examination, as well as discussing diagnoses/ recommendations with patient and answering all questions.                      Thank you for allowing me to participate in the care of your patient.      Sincerely,     Renetta Sosa MD     [facility]  cc:   Gabriel Leon MD  [address] [facility] [address] [facility] [address]  [place] [address]

## 2022-05-19 LAB
MDC_IDC_LEAD_IMPLANT_DT: NORMAL
MDC_IDC_LEAD_LOCATION: NORMAL
MDC_IDC_LEAD_LOCATION_DETAIL_1: NORMAL
MDC_IDC_LEAD_MFG: NORMAL
MDC_IDC_LEAD_MODEL: NORMAL
MDC_IDC_LEAD_POLARITY_TYPE: NORMAL
MDC_IDC_LEAD_SERIAL: NORMAL
MDC_IDC_LEAD_SPECIAL_FUNCTION: NORMAL
MDC_IDC_MSMT_BATTERY_DTM: NORMAL
MDC_IDC_MSMT_BATTERY_REMAINING_LONGEVITY: 72 MO
MDC_IDC_MSMT_BATTERY_REMAINING_PERCENTAGE: 88 %
MDC_IDC_MSMT_BATTERY_STATUS: NORMAL
MDC_IDC_MSMT_LEADCHNL_RV_IMPEDANCE_VALUE: 552 OHM
MDC_IDC_MSMT_LEADCHNL_RV_PACING_THRESHOLD_AMPLITUDE: 0.6 V
MDC_IDC_MSMT_LEADCHNL_RV_PACING_THRESHOLD_PULSEWIDTH: 0.4 MS
MDC_IDC_PG_IMPLANT_DTM: NORMAL
MDC_IDC_PG_MFG: NORMAL
MDC_IDC_PG_MODEL: NORMAL
MDC_IDC_PG_SERIAL: NORMAL
MDC_IDC_PG_TYPE: NORMAL
MDC_IDC_SESS_CLINIC_NAME: NORMAL
MDC_IDC_SESS_DTM: NORMAL
MDC_IDC_SESS_TYPE: NORMAL
MDC_IDC_SET_BRADY_LOWRATE: 60 {BEATS}/MIN
MDC_IDC_SET_BRADY_MAX_SENSOR_RATE: 120 {BEATS}/MIN
MDC_IDC_SET_BRADY_MODE: NORMAL
MDC_IDC_SET_LEADCHNL_RV_PACING_AMPLITUDE: NORMAL
MDC_IDC_SET_LEADCHNL_RV_PACING_CAPTURE_MODE: NORMAL
MDC_IDC_SET_LEADCHNL_RV_PACING_POLARITY: NORMAL
MDC_IDC_SET_LEADCHNL_RV_PACING_PULSEWIDTH: 0.4 MS
MDC_IDC_SET_LEADCHNL_RV_SENSING_ADAPTATION_MODE: NORMAL
MDC_IDC_SET_LEADCHNL_RV_SENSING_POLARITY: NORMAL
MDC_IDC_SET_LEADCHNL_RV_SENSING_SENSITIVITY: 2.5 MV
MDC_IDC_SET_ZONE_DETECTION_INTERVAL: 375 MS
MDC_IDC_SET_ZONE_TYPE: NORMAL
MDC_IDC_SET_ZONE_VENDOR_TYPE: NORMAL
MDC_IDC_STAT_BRADY_DTM_END: NORMAL
MDC_IDC_STAT_BRADY_DTM_START: NORMAL
MDC_IDC_STAT_BRADY_RV_PERCENT_PACED: 93 %
MDC_IDC_STAT_EPISODE_RECENT_COUNT: 0
MDC_IDC_STAT_EPISODE_RECENT_COUNT_DTM_END: NORMAL
MDC_IDC_STAT_EPISODE_RECENT_COUNT_DTM_START: NORMAL
MDC_IDC_STAT_EPISODE_TYPE: NORMAL
MDC_IDC_STAT_EPISODE_VENDOR_TYPE: NORMAL

## 2022-08-30 ENCOUNTER — ANCILLARY PROCEDURE (OUTPATIENT)
Dept: CARDIOLOGY | Facility: CLINIC | Age: 87
End: 2022-08-30
Attending: INTERNAL MEDICINE
Payer: COMMERCIAL

## 2022-08-30 DIAGNOSIS — I49.5 SICK SINUS SYNDROME (H): ICD-10-CM

## 2022-08-30 DIAGNOSIS — Z95.0 PACEMAKER: ICD-10-CM

## 2022-09-04 LAB
MDC_IDC_EPISODE_DTM: NORMAL
MDC_IDC_EPISODE_DTM: NORMAL
MDC_IDC_EPISODE_ID: NORMAL
MDC_IDC_EPISODE_ID: NORMAL
MDC_IDC_EPISODE_TYPE: NORMAL
MDC_IDC_EPISODE_TYPE: NORMAL
MDC_IDC_LEAD_IMPLANT_DT: NORMAL
MDC_IDC_LEAD_LOCATION: NORMAL
MDC_IDC_LEAD_LOCATION_DETAIL_1: NORMAL
MDC_IDC_LEAD_MFG: NORMAL
MDC_IDC_LEAD_MODEL: NORMAL
MDC_IDC_LEAD_POLARITY_TYPE: NORMAL
MDC_IDC_LEAD_SERIAL: NORMAL
MDC_IDC_LEAD_SPECIAL_FUNCTION: NORMAL
MDC_IDC_MSMT_BATTERY_DTM: NORMAL
MDC_IDC_MSMT_BATTERY_REMAINING_LONGEVITY: 66 MO
MDC_IDC_MSMT_BATTERY_REMAINING_PERCENTAGE: 83 %
MDC_IDC_MSMT_BATTERY_STATUS: NORMAL
MDC_IDC_MSMT_LEADCHNL_RV_IMPEDANCE_VALUE: 538 OHM
MDC_IDC_MSMT_LEADCHNL_RV_PACING_THRESHOLD_AMPLITUDE: 0.6 V
MDC_IDC_MSMT_LEADCHNL_RV_PACING_THRESHOLD_PULSEWIDTH: 0.4 MS
MDC_IDC_PG_IMPLANT_DTM: NORMAL
MDC_IDC_PG_MFG: NORMAL
MDC_IDC_PG_MODEL: NORMAL
MDC_IDC_PG_SERIAL: NORMAL
MDC_IDC_PG_TYPE: NORMAL
MDC_IDC_SESS_CLINIC_NAME: NORMAL
MDC_IDC_SESS_DTM: NORMAL
MDC_IDC_SESS_TYPE: NORMAL
MDC_IDC_SET_BRADY_LOWRATE: 60 {BEATS}/MIN
MDC_IDC_SET_BRADY_MAX_SENSOR_RATE: 120 {BEATS}/MIN
MDC_IDC_SET_BRADY_MODE: NORMAL
MDC_IDC_SET_LEADCHNL_RV_PACING_AMPLITUDE: 1.5 V
MDC_IDC_SET_LEADCHNL_RV_PACING_CAPTURE_MODE: NORMAL
MDC_IDC_SET_LEADCHNL_RV_PACING_POLARITY: NORMAL
MDC_IDC_SET_LEADCHNL_RV_PACING_PULSEWIDTH: 0.4 MS
MDC_IDC_SET_LEADCHNL_RV_SENSING_ADAPTATION_MODE: NORMAL
MDC_IDC_SET_LEADCHNL_RV_SENSING_POLARITY: NORMAL
MDC_IDC_SET_LEADCHNL_RV_SENSING_SENSITIVITY: 2.5 MV
MDC_IDC_SET_ZONE_DETECTION_INTERVAL: 375 MS
MDC_IDC_SET_ZONE_TYPE: NORMAL
MDC_IDC_SET_ZONE_VENDOR_TYPE: NORMAL
MDC_IDC_STAT_BRADY_DTM_END: NORMAL
MDC_IDC_STAT_BRADY_DTM_START: NORMAL
MDC_IDC_STAT_BRADY_RV_PERCENT_PACED: 90 %
MDC_IDC_STAT_EPISODE_RECENT_COUNT: 0
MDC_IDC_STAT_EPISODE_RECENT_COUNT: 0
MDC_IDC_STAT_EPISODE_RECENT_COUNT_DTM_END: NORMAL
MDC_IDC_STAT_EPISODE_RECENT_COUNT_DTM_END: NORMAL
MDC_IDC_STAT_EPISODE_RECENT_COUNT_DTM_START: NORMAL
MDC_IDC_STAT_EPISODE_RECENT_COUNT_DTM_START: NORMAL
MDC_IDC_STAT_EPISODE_TYPE: NORMAL
MDC_IDC_STAT_EPISODE_TYPE: NORMAL
MDC_IDC_STAT_EPISODE_VENDOR_TYPE: NORMAL
MDC_IDC_STAT_EPISODE_VENDOR_TYPE: NORMAL

## 2022-09-04 PROCEDURE — 93294 REM INTERROG EVL PM/LDLS PM: CPT | Performed by: INTERNAL MEDICINE

## 2022-09-04 PROCEDURE — 93296 REM INTERROG EVL PM/IDS: CPT | Performed by: INTERNAL MEDICINE

## 2022-12-06 ENCOUNTER — ANCILLARY PROCEDURE (OUTPATIENT)
Dept: CARDIOLOGY | Facility: CLINIC | Age: 87
End: 2022-12-06
Attending: INTERNAL MEDICINE
Payer: COMMERCIAL

## 2022-12-06 DIAGNOSIS — I49.5 SICK SINUS SYNDROME (H): ICD-10-CM

## 2022-12-06 DIAGNOSIS — Z95.0 PACEMAKER: ICD-10-CM

## 2022-12-07 LAB
MDC_IDC_EPISODE_DTM: NORMAL
MDC_IDC_EPISODE_ID: NORMAL
MDC_IDC_EPISODE_TYPE: NORMAL
MDC_IDC_LEAD_IMPLANT_DT: NORMAL
MDC_IDC_LEAD_LOCATION: NORMAL
MDC_IDC_LEAD_LOCATION_DETAIL_1: NORMAL
MDC_IDC_LEAD_MFG: NORMAL
MDC_IDC_LEAD_MODEL: NORMAL
MDC_IDC_LEAD_POLARITY_TYPE: NORMAL
MDC_IDC_LEAD_SERIAL: NORMAL
MDC_IDC_LEAD_SPECIAL_FUNCTION: NORMAL
MDC_IDC_MSMT_BATTERY_DTM: NORMAL
MDC_IDC_MSMT_BATTERY_REMAINING_LONGEVITY: 66 MO
MDC_IDC_MSMT_BATTERY_REMAINING_PERCENTAGE: 80 %
MDC_IDC_MSMT_BATTERY_STATUS: NORMAL
MDC_IDC_MSMT_LEADCHNL_RV_IMPEDANCE_VALUE: 537 OHM
MDC_IDC_MSMT_LEADCHNL_RV_PACING_THRESHOLD_AMPLITUDE: 0.7 V
MDC_IDC_MSMT_LEADCHNL_RV_PACING_THRESHOLD_PULSEWIDTH: 0.4 MS
MDC_IDC_PG_IMPLANT_DTM: NORMAL
MDC_IDC_PG_MFG: NORMAL
MDC_IDC_PG_MODEL: NORMAL
MDC_IDC_PG_SERIAL: NORMAL
MDC_IDC_PG_TYPE: NORMAL
MDC_IDC_SESS_CLINIC_NAME: NORMAL
MDC_IDC_SESS_DTM: NORMAL
MDC_IDC_SESS_TYPE: NORMAL
MDC_IDC_SET_BRADY_LOWRATE: 60 {BEATS}/MIN
MDC_IDC_SET_BRADY_MAX_SENSOR_RATE: 120 {BEATS}/MIN
MDC_IDC_SET_BRADY_MODE: NORMAL
MDC_IDC_SET_LEADCHNL_RV_PACING_AMPLITUDE: 1.5 V
MDC_IDC_SET_LEADCHNL_RV_PACING_CAPTURE_MODE: NORMAL
MDC_IDC_SET_LEADCHNL_RV_PACING_POLARITY: NORMAL
MDC_IDC_SET_LEADCHNL_RV_PACING_PULSEWIDTH: 0.4 MS
MDC_IDC_SET_LEADCHNL_RV_SENSING_ADAPTATION_MODE: NORMAL
MDC_IDC_SET_LEADCHNL_RV_SENSING_POLARITY: NORMAL
MDC_IDC_SET_LEADCHNL_RV_SENSING_SENSITIVITY: 2.5 MV
MDC_IDC_SET_ZONE_DETECTION_INTERVAL: 375 MS
MDC_IDC_SET_ZONE_TYPE: NORMAL
MDC_IDC_SET_ZONE_VENDOR_TYPE: NORMAL
MDC_IDC_STAT_BRADY_DTM_END: NORMAL
MDC_IDC_STAT_BRADY_DTM_START: NORMAL
MDC_IDC_STAT_BRADY_RV_PERCENT_PACED: 92 %
MDC_IDC_STAT_EPISODE_RECENT_COUNT: 0
MDC_IDC_STAT_EPISODE_RECENT_COUNT: 0
MDC_IDC_STAT_EPISODE_RECENT_COUNT_DTM_END: NORMAL
MDC_IDC_STAT_EPISODE_RECENT_COUNT_DTM_END: NORMAL
MDC_IDC_STAT_EPISODE_RECENT_COUNT_DTM_START: NORMAL
MDC_IDC_STAT_EPISODE_RECENT_COUNT_DTM_START: NORMAL
MDC_IDC_STAT_EPISODE_TYPE: NORMAL
MDC_IDC_STAT_EPISODE_TYPE: NORMAL
MDC_IDC_STAT_EPISODE_VENDOR_TYPE: NORMAL
MDC_IDC_STAT_EPISODE_VENDOR_TYPE: NORMAL

## 2022-12-07 PROCEDURE — 93296 REM INTERROG EVL PM/IDS: CPT | Performed by: INTERNAL MEDICINE

## 2022-12-07 PROCEDURE — 93294 REM INTERROG EVL PM/LDLS PM: CPT | Performed by: INTERNAL MEDICINE

## 2023-03-16 ENCOUNTER — ANCILLARY PROCEDURE (OUTPATIENT)
Dept: CARDIOLOGY | Facility: CLINIC | Age: 88
End: 2023-03-16
Attending: INTERNAL MEDICINE
Payer: COMMERCIAL

## 2023-03-16 DIAGNOSIS — I49.5 SICK SINUS SYNDROME (H): ICD-10-CM

## 2023-03-16 DIAGNOSIS — Z95.0 PACEMAKER: ICD-10-CM

## 2023-03-16 LAB
MDC_IDC_EPISODE_DTM: NORMAL
MDC_IDC_EPISODE_ID: NORMAL
MDC_IDC_EPISODE_TYPE: NORMAL
MDC_IDC_LEAD_IMPLANT_DT: NORMAL
MDC_IDC_LEAD_LOCATION: NORMAL
MDC_IDC_LEAD_LOCATION_DETAIL_1: NORMAL
MDC_IDC_LEAD_MFG: NORMAL
MDC_IDC_LEAD_MODEL: NORMAL
MDC_IDC_LEAD_POLARITY_TYPE: NORMAL
MDC_IDC_LEAD_SERIAL: NORMAL
MDC_IDC_LEAD_SPECIAL_FUNCTION: NORMAL
MDC_IDC_MSMT_BATTERY_DTM: NORMAL
MDC_IDC_MSMT_BATTERY_REMAINING_LONGEVITY: 60 MO
MDC_IDC_MSMT_BATTERY_REMAINING_PERCENTAGE: 75 %
MDC_IDC_MSMT_BATTERY_STATUS: NORMAL
MDC_IDC_MSMT_LEADCHNL_RV_IMPEDANCE_VALUE: 494 OHM
MDC_IDC_MSMT_LEADCHNL_RV_PACING_THRESHOLD_AMPLITUDE: 0.6 V
MDC_IDC_MSMT_LEADCHNL_RV_PACING_THRESHOLD_PULSEWIDTH: 0.4 MS
MDC_IDC_PG_IMPLANT_DTM: NORMAL
MDC_IDC_PG_MFG: NORMAL
MDC_IDC_PG_MODEL: NORMAL
MDC_IDC_PG_SERIAL: NORMAL
MDC_IDC_PG_TYPE: NORMAL
MDC_IDC_SESS_CLINIC_NAME: NORMAL
MDC_IDC_SESS_DTM: NORMAL
MDC_IDC_SESS_TYPE: NORMAL
MDC_IDC_SET_BRADY_LOWRATE: 60 {BEATS}/MIN
MDC_IDC_SET_BRADY_MAX_SENSOR_RATE: 120 {BEATS}/MIN
MDC_IDC_SET_BRADY_MODE: NORMAL
MDC_IDC_SET_LEADCHNL_RV_PACING_AMPLITUDE: 1.5 V
MDC_IDC_SET_LEADCHNL_RV_PACING_CAPTURE_MODE: NORMAL
MDC_IDC_SET_LEADCHNL_RV_PACING_POLARITY: NORMAL
MDC_IDC_SET_LEADCHNL_RV_PACING_PULSEWIDTH: 0.4 MS
MDC_IDC_SET_LEADCHNL_RV_SENSING_ADAPTATION_MODE: NORMAL
MDC_IDC_SET_LEADCHNL_RV_SENSING_POLARITY: NORMAL
MDC_IDC_SET_LEADCHNL_RV_SENSING_SENSITIVITY: 2.5 MV
MDC_IDC_SET_ZONE_DETECTION_INTERVAL: 375 MS
MDC_IDC_SET_ZONE_TYPE: NORMAL
MDC_IDC_SET_ZONE_VENDOR_TYPE: NORMAL
MDC_IDC_STAT_BRADY_DTM_END: NORMAL
MDC_IDC_STAT_BRADY_DTM_START: NORMAL
MDC_IDC_STAT_BRADY_RV_PERCENT_PACED: 93 %
MDC_IDC_STAT_EPISODE_RECENT_COUNT: 0
MDC_IDC_STAT_EPISODE_RECENT_COUNT: 0
MDC_IDC_STAT_EPISODE_RECENT_COUNT_DTM_END: NORMAL
MDC_IDC_STAT_EPISODE_RECENT_COUNT_DTM_END: NORMAL
MDC_IDC_STAT_EPISODE_RECENT_COUNT_DTM_START: NORMAL
MDC_IDC_STAT_EPISODE_RECENT_COUNT_DTM_START: NORMAL
MDC_IDC_STAT_EPISODE_TYPE: NORMAL
MDC_IDC_STAT_EPISODE_TYPE: NORMAL
MDC_IDC_STAT_EPISODE_VENDOR_TYPE: NORMAL
MDC_IDC_STAT_EPISODE_VENDOR_TYPE: NORMAL

## 2023-03-16 PROCEDURE — 93296 REM INTERROG EVL PM/IDS: CPT | Performed by: INTERNAL MEDICINE

## 2023-03-16 PROCEDURE — 93294 REM INTERROG EVL PM/LDLS PM: CPT | Performed by: INTERNAL MEDICINE

## 2023-05-17 ENCOUNTER — TRANSFERRED RECORDS (OUTPATIENT)
Dept: HEALTH INFORMATION MANAGEMENT | Facility: CLINIC | Age: 88
End: 2023-05-17

## 2023-05-17 ENCOUNTER — LAB REQUISITION (OUTPATIENT)
Dept: LAB | Facility: CLINIC | Age: 88
End: 2023-05-17
Payer: COMMERCIAL

## 2023-05-17 DIAGNOSIS — E03.9 HYPOTHYROIDISM, UNSPECIFIED: ICD-10-CM

## 2023-05-17 DIAGNOSIS — I10 ESSENTIAL (PRIMARY) HYPERTENSION: ICD-10-CM

## 2023-05-17 DIAGNOSIS — E83.42 HYPOMAGNESEMIA: ICD-10-CM

## 2023-05-17 PROCEDURE — 84443 ASSAY THYROID STIM HORMONE: CPT | Mod: ORL | Performed by: NURSE PRACTITIONER

## 2023-05-17 PROCEDURE — 83735 ASSAY OF MAGNESIUM: CPT | Mod: ORL | Performed by: NURSE PRACTITIONER

## 2023-05-17 PROCEDURE — 80048 BASIC METABOLIC PNL TOTAL CA: CPT | Mod: ORL | Performed by: NURSE PRACTITIONER

## 2023-05-18 LAB
ANION GAP SERPL CALCULATED.3IONS-SCNC: 14 MMOL/L (ref 7–15)
BUN SERPL-MCNC: 24 MG/DL (ref 8–23)
CALCIUM SERPL-MCNC: 9.8 MG/DL (ref 8.2–9.6)
CHLORIDE SERPL-SCNC: 98 MMOL/L (ref 98–107)
CREAT SERPL-MCNC: 1.48 MG/DL (ref 0.51–0.95)
DEPRECATED HCO3 PLAS-SCNC: 29 MMOL/L (ref 22–29)
GFR SERPL CREATININE-BSD FRML MDRD: 33 ML/MIN/1.73M2
GLUCOSE SERPL-MCNC: 93 MG/DL (ref 70–99)
MAGNESIUM SERPL-MCNC: 2.5 MG/DL (ref 1.7–2.3)
POTASSIUM SERPL-SCNC: 4.5 MMOL/L (ref 3.4–5.3)
SODIUM SERPL-SCNC: 141 MMOL/L (ref 136–145)
TSH SERPL DL<=0.005 MIU/L-ACNC: 0.91 UIU/ML (ref 0.3–4.2)

## 2023-06-08 ENCOUNTER — TRANSFERRED RECORDS (OUTPATIENT)
Dept: HEALTH INFORMATION MANAGEMENT | Facility: CLINIC | Age: 88
End: 2023-06-08

## 2023-06-08 ENCOUNTER — LAB REQUISITION (OUTPATIENT)
Dept: LAB | Facility: CLINIC | Age: 88
End: 2023-06-08
Payer: COMMERCIAL

## 2023-06-08 DIAGNOSIS — I10 ESSENTIAL (PRIMARY) HYPERTENSION: ICD-10-CM

## 2023-06-08 LAB
ANION GAP SERPL CALCULATED.3IONS-SCNC: 12 MMOL/L (ref 7–15)
BUN SERPL-MCNC: 20.4 MG/DL (ref 8–23)
CALCIUM SERPL-MCNC: 9.5 MG/DL (ref 8.2–9.6)
CHLORIDE SERPL-SCNC: 101 MMOL/L (ref 98–107)
CREAT SERPL-MCNC: 1.5 MG/DL (ref 0.51–0.95)
DEPRECATED HCO3 PLAS-SCNC: 29 MMOL/L (ref 22–29)
GFR SERPL CREATININE-BSD FRML MDRD: 32 ML/MIN/1.73M2
GLUCOSE SERPL-MCNC: 95 MG/DL (ref 70–99)
POTASSIUM SERPL-SCNC: 4.1 MMOL/L (ref 3.4–5.3)
SODIUM SERPL-SCNC: 142 MMOL/L (ref 136–145)

## 2023-06-08 PROCEDURE — 80048 BASIC METABOLIC PNL TOTAL CA: CPT | Mod: ORL | Performed by: NURSE PRACTITIONER

## 2023-06-13 DIAGNOSIS — Z95.0 CARDIAC PACEMAKER IN SITU: Primary | ICD-10-CM

## 2023-06-13 DIAGNOSIS — I49.5 SICK SINUS SYNDROME (H): ICD-10-CM

## 2023-06-27 DIAGNOSIS — Z95.0 CARDIAC PACEMAKER IN SITU: Primary | ICD-10-CM

## 2023-06-27 DIAGNOSIS — I49.5 SICK SINUS SYNDROME (H): ICD-10-CM

## 2023-08-01 ENCOUNTER — ANCILLARY PROCEDURE (OUTPATIENT)
Dept: CARDIOLOGY | Facility: CLINIC | Age: 88
End: 2023-08-01
Attending: INTERNAL MEDICINE
Payer: COMMERCIAL

## 2023-08-01 DIAGNOSIS — Z95.0 PACEMAKER: ICD-10-CM

## 2023-08-01 PROCEDURE — 93294 REM INTERROG EVL PM/LDLS PM: CPT | Performed by: INTERNAL MEDICINE

## 2023-08-01 PROCEDURE — 93296 REM INTERROG EVL PM/IDS: CPT | Performed by: INTERNAL MEDICINE

## 2023-08-02 LAB
MDC_IDC_EPISODE_DTM: NORMAL
MDC_IDC_EPISODE_DTM: NORMAL
MDC_IDC_EPISODE_ID: NORMAL
MDC_IDC_EPISODE_ID: NORMAL
MDC_IDC_EPISODE_TYPE: NORMAL
MDC_IDC_EPISODE_TYPE: NORMAL
MDC_IDC_LEAD_IMPLANT_DT: NORMAL
MDC_IDC_LEAD_LOCATION: NORMAL
MDC_IDC_LEAD_LOCATION_DETAIL_1: NORMAL
MDC_IDC_LEAD_MFG: NORMAL
MDC_IDC_LEAD_MODEL: NORMAL
MDC_IDC_LEAD_POLARITY_TYPE: NORMAL
MDC_IDC_LEAD_SERIAL: NORMAL
MDC_IDC_LEAD_SPECIAL_FUNCTION: NORMAL
MDC_IDC_MSMT_BATTERY_DTM: NORMAL
MDC_IDC_MSMT_BATTERY_REMAINING_LONGEVITY: 54 MO
MDC_IDC_MSMT_BATTERY_REMAINING_PERCENTAGE: 66 %
MDC_IDC_MSMT_BATTERY_STATUS: NORMAL
MDC_IDC_MSMT_LEADCHNL_RV_IMPEDANCE_VALUE: 511 OHM
MDC_IDC_MSMT_LEADCHNL_RV_PACING_THRESHOLD_AMPLITUDE: 0.7 V
MDC_IDC_MSMT_LEADCHNL_RV_PACING_THRESHOLD_PULSEWIDTH: 0.4 MS
MDC_IDC_PG_IMPLANT_DTM: NORMAL
MDC_IDC_PG_MFG: NORMAL
MDC_IDC_PG_MODEL: NORMAL
MDC_IDC_PG_SERIAL: NORMAL
MDC_IDC_PG_TYPE: NORMAL
MDC_IDC_SESS_CLINIC_NAME: NORMAL
MDC_IDC_SESS_DTM: NORMAL
MDC_IDC_SESS_TYPE: NORMAL
MDC_IDC_SET_BRADY_LOWRATE: 60 {BEATS}/MIN
MDC_IDC_SET_BRADY_MAX_SENSOR_RATE: 120 {BEATS}/MIN
MDC_IDC_SET_BRADY_MODE: NORMAL
MDC_IDC_SET_LEADCHNL_RV_PACING_AMPLITUDE: 1.5 V
MDC_IDC_SET_LEADCHNL_RV_PACING_CAPTURE_MODE: NORMAL
MDC_IDC_SET_LEADCHNL_RV_PACING_POLARITY: NORMAL
MDC_IDC_SET_LEADCHNL_RV_PACING_PULSEWIDTH: 0.4 MS
MDC_IDC_SET_LEADCHNL_RV_SENSING_ADAPTATION_MODE: NORMAL
MDC_IDC_SET_LEADCHNL_RV_SENSING_POLARITY: NORMAL
MDC_IDC_SET_LEADCHNL_RV_SENSING_SENSITIVITY: 2.5 MV
MDC_IDC_SET_ZONE_DETECTION_INTERVAL: 375 MS
MDC_IDC_SET_ZONE_TYPE: NORMAL
MDC_IDC_SET_ZONE_VENDOR_TYPE: NORMAL
MDC_IDC_STAT_BRADY_DTM_END: NORMAL
MDC_IDC_STAT_BRADY_DTM_START: NORMAL
MDC_IDC_STAT_BRADY_RV_PERCENT_PACED: 93 %
MDC_IDC_STAT_EPISODE_RECENT_COUNT: 0
MDC_IDC_STAT_EPISODE_RECENT_COUNT: 0
MDC_IDC_STAT_EPISODE_RECENT_COUNT_DTM_END: NORMAL
MDC_IDC_STAT_EPISODE_RECENT_COUNT_DTM_END: NORMAL
MDC_IDC_STAT_EPISODE_RECENT_COUNT_DTM_START: NORMAL
MDC_IDC_STAT_EPISODE_RECENT_COUNT_DTM_START: NORMAL
MDC_IDC_STAT_EPISODE_TYPE: NORMAL
MDC_IDC_STAT_EPISODE_TYPE: NORMAL
MDC_IDC_STAT_EPISODE_VENDOR_TYPE: NORMAL
MDC_IDC_STAT_EPISODE_VENDOR_TYPE: NORMAL

## 2023-09-19 ENCOUNTER — APPOINTMENT (OUTPATIENT)
Dept: URBAN - METROPOLITAN AREA CLINIC 260 | Age: 88
Setting detail: DERMATOLOGY
End: 2023-09-20

## 2023-09-19 VITALS — HEIGHT: 55 IN | WEIGHT: 140 LBS

## 2023-09-19 DIAGNOSIS — L30.4 ERYTHEMA INTERTRIGO: ICD-10-CM

## 2023-09-19 PROCEDURE — 99203 OFFICE O/P NEW LOW 30 MIN: CPT

## 2023-09-19 PROCEDURE — OTHER COUNSELING: OTHER

## 2023-09-19 PROCEDURE — OTHER PRESCRIPTION MEDICATION MANAGEMENT: OTHER

## 2023-09-19 PROCEDURE — OTHER MIPS QUALITY: OTHER

## 2023-09-19 PROCEDURE — OTHER PRESCRIPTION: OTHER

## 2023-09-19 RX ORDER — KETOCONAZOLE 20 MG/G
2% CREAM TOPICAL BID
Qty: 60 | Refills: 2 | Status: CANCELLED | COMMUNITY
Start: 2023-09-19

## 2023-09-19 ASSESSMENT — LOCATION SIMPLE DESCRIPTION DERM
LOCATION SIMPLE: LEFT THIGH
LOCATION SIMPLE: ABDOMEN
LOCATION SIMPLE: RIGHT BREAST

## 2023-09-19 ASSESSMENT — SEVERITY ASSESSMENT: SEVERITY: MODERATE

## 2023-09-19 ASSESSMENT — LOCATION DETAILED DESCRIPTION DERM
LOCATION DETAILED: RIGHT INFRAMAMMARY CREASE (INNER QUADRANT)
LOCATION DETAILED: RIGHT LATERAL ABDOMEN
LOCATION DETAILED: LEFT ANTERIOR PROXIMAL THIGH

## 2023-09-19 ASSESSMENT — LOCATION ZONE DERM
LOCATION ZONE: TRUNK
LOCATION ZONE: LEG

## 2023-09-19 ASSESSMENT — BSA RASH: BSA RASH: 6

## 2023-09-19 NOTE — PROCEDURE: MIPS QUALITY
Quality 110: Preventive Care And Screening: Influenza Immunization: Influenza Immunization previously received during influenza season
Quality 111:Pneumonia Vaccination Status For Older Adults: Patient received any pneumococcal conjugate or polysaccharide vaccine on or after their 60th birthday and before the end of the measurement period
Quality 431: Preventive Care And Screening: Unhealthy Alcohol Use - Screening: Patient not identified as an unhealthy alcohol user when screened for unhealthy alcohol use using a systematic screening method
Detail Level: Detailed
Quality 226: Preventive Care And Screening: Tobacco Use: Screening And Cessation Intervention: Patient screened for tobacco use and is an ex/non-smoker
Quality 130: Documentation Of Current Medications In The Medical Record: Current Medications Documented

## 2023-09-19 NOTE — PROCEDURE: PRESCRIPTION MEDICATION MANAGEMENT
Render In Strict Bullet Format?: Yes
Detail Level: Zone
Initiate Treatment: Ketoconazole 2% cream BID
Modify Regimen: Recommended keep the area dry \\nRecommended using powder to keep the area dry

## 2023-09-20 ENCOUNTER — RX ONLY (RX ONLY)
Age: 88
End: 2023-09-20

## 2023-09-20 RX ORDER — KETOCONAZOLE 20 MG/G
2% CREAM TOPICAL BID
Qty: 60 | Refills: 2 | Status: ERX

## 2023-10-09 ENCOUNTER — ANCILLARY PROCEDURE (OUTPATIENT)
Dept: CARDIOLOGY | Facility: CLINIC | Age: 88
End: 2023-10-09
Attending: INTERNAL MEDICINE
Payer: COMMERCIAL

## 2023-10-09 VITALS
HEIGHT: 66 IN | HEART RATE: 60 BPM | DIASTOLIC BLOOD PRESSURE: 52 MMHG | WEIGHT: 140.4 LBS | RESPIRATION RATE: 16 BRPM | OXYGEN SATURATION: 94 % | SYSTOLIC BLOOD PRESSURE: 94 MMHG | BODY MASS INDEX: 22.56 KG/M2

## 2023-10-09 DIAGNOSIS — Z95.0 CARDIAC PACEMAKER IN SITU: ICD-10-CM

## 2023-10-09 DIAGNOSIS — I25.10 CORONARY ARTERY DISEASE INVOLVING NATIVE CORONARY ARTERY OF NATIVE HEART WITHOUT ANGINA PECTORIS: ICD-10-CM

## 2023-10-09 DIAGNOSIS — Z95.0 CARDIAC PACEMAKER IN SITU: Primary | ICD-10-CM

## 2023-10-09 DIAGNOSIS — I48.21 PERMANENT ATRIAL FIBRILLATION (H): ICD-10-CM

## 2023-10-09 DIAGNOSIS — I49.5 SICK SINUS SYNDROME (H): Primary | ICD-10-CM

## 2023-10-09 DIAGNOSIS — Z95.2 S/P TAVR (TRANSCATHETER AORTIC VALVE REPLACEMENT): ICD-10-CM

## 2023-10-09 DIAGNOSIS — I49.5 SICK SINUS SYNDROME (H): ICD-10-CM

## 2023-10-09 PROCEDURE — 99214 OFFICE O/P EST MOD 30 MIN: CPT | Performed by: INTERNAL MEDICINE

## 2023-10-09 PROCEDURE — 93279 PRGRMG DEV EVAL PM/LDLS PM: CPT | Performed by: INTERNAL MEDICINE

## 2023-10-09 NOTE — LETTER
10/9/2023    FENG Katz CNP  [address] [place] [address]    RE: Ayanna Argueta       Dear Colleague,     I had the pleasure of seeing Ayanna Argueta in the [facility].      Thank you, Kristyn Guaman CNP for asking the [facility] team to see Ms. Ayanna Argueta to follow-up sinus node dysfunction status post permanent pacemaker insertion.    Assessment/Recommendations   Assessment:    1.  Sinus node dysfunction, status post permanent pacemaker insertion with subsequent development of permanent atrial fibrillation.  Pacemaker check done today demonstrates normal device operation.  She has 4 years remaining on her battery.  We will continue with remote checks over the course of the year and a follow-up visit in the clinic in 1 years time.  2.  Chronic atrial fibrillation, rate controlled.  She remains on warfarin anticoagulation to minimize risks of thromboembolic events.  Given her neuropathy and need for a cane or walker, we may want to consider a Watchman device going forward, especially if her gait becomes more unstable.  This would allow her to come off anticoagulation, thus reducing the risks of a fall with intracerebral bleed.  3.  Aortic valve stenosis, status post TAVR at the [facility] in 2016.  Echocardiogram 1 year ago demonstrated normal prosthetic valve function.  4.  Nonobstructive coronary artery disease.  She reports no exertional chest discomfort.    Plan:  1.  Continue current medications  2.  Follow-up visit in 1 year in conjunction with her in clinic pacemaker check  3.  Consider evaluation for Watchman device implant if the patient's gait becomes more unsteady.       History of Present Illness    Ms. Ayanna Argueta is a 89 year old female with history of aortic valve stenosis status post TAVR in 2016, sick sinus syndrome status post permanent pacemaker insertion with subsequent development of chronic atrial fibrillation on warfarin anticoagulation,  "nonobstructive coronary artery disease who presents to the clinic today for her yearly follow-up.  From a cardiac standpoint, she feels she is doing well.  She denies any symptoms of chest discomfort, orthopnea, PND or lower extremity edema.  Has become slightly more unsteady on her feet as a result of peripheral neuropathy.  This has required her now to use a cane or walker with ambulation.    ECG (personally reviewed): No ECG today.  Device check was performed in conjunction with her visit demonstrating normal device operation.  No evidence of ventricular arrhythmias.  Lead and battery status stable.    Cardiac Imaging Studies (personally reviewed): No recent cardiac imaging     Physical Examination Review of Systems   BP 94/52 (BP Location: Right arm, Patient Position: Sitting, Cuff Size: Adult Regular)   Pulse 60   Resp 16   Ht 1.676 m (5' 6\")   Wt 63.7 kg (140 lb 6.4 oz)   SpO2 94%   BMI 22.66 kg/m    Body mass index is 22.66 kg/m .  Wt Readings from Last 3 Encounters:   10/09/23 63.7 kg (140 lb 6.4 oz)   05/17/22 65.8 kg (145 lb)   03/22/21 68.5 kg (151 lb)     General Appearance:   Awake, Alert, No acute distress.   HEENT:  No scleral icterus; the mucous membranes were pink and moist.   Neck: No cervical bruits or jugular venous distention    Chest: The spine was straight. The chest was symmetric.   Lungs:   Respirations unlabored; the lungs are clear to auscultation. No wheezing   Cardiovascular:   Irregularly irregular rhythm.  S1, S2 normal.  No murmur   Abdomen:  No organomegaly, masses, bruits, or tenderness. Bowels sounds are present   Extremities: No peripheral edema   Skin: No xanthelasma. Warm, Dry.   Musculoskeletal: No tenderness.   Neurologic: Mood and affect are appropriate.    Enc Vitals  BP: 94/52  Pulse: 60  Resp: 16  SpO2: 94 %  Weight: 63.7 kg (140 lb 6.4 oz)  Height: 167.6 cm (5' 6\")                                         Medical History  Surgical History Family History Social " History   Past Medical History:   Diagnosis Date    Aortic valve disorder     Atrial fibrillation (H)     Syncope     Past Surgical History:   Procedure Laterality Date    CARDIAC VALVE REPLACEMENT  10/01/2016    TAVR    IMPLANT PACEMAKER      IR LUMBAR EPIDURAL STEROID INJECTION  10/19/2007    TRANSCATHETER AORTIC-VALVE REPLACEMENT      No family history on file. Social History     Socioeconomic History    Marital status:      Spouse name: Not on file    Number of children: Not on file    Years of education: Not on file    Highest education level: Not on file   Occupational History    Not on file   Tobacco Use    Smoking status: Never    Smokeless tobacco: Never   Substance and Sexual Activity    Alcohol use: Not on file    Drug use: Not on file    Sexual activity: Not on file   Other Topics Concern    Not on file   Social History Narrative    Not on file     Social Determinants of Health     Financial Resource Strain: Not on file   Food Insecurity: Not on file   Transportation Needs: Not on file   Physical Activity: Not on file   Stress: Not on file   Social Connections: Not on file   Interpersonal Safety: Not on file   Housing Stability: Not on file          Medications  Allergies   Current Outpatient Medications   Medication Sig Dispense Refill    amoxicillin (AMOXIL) 500 MG capsule 2000 mg before dental procedure      aspirin 81 MG EC tablet [ASPIRIN 81 MG EC TABLET] Take 81 mg by mouth daily.      calcium carbonate (OS-JIGAR) 600 mg (1,500 mg) tablet Take 600 mg by mouth daily      diphenhydrAMINE (BENADRYL) 25 mg capsule [DIPHENHYDRAMINE (BENADRYL) 25 MG CAPSULE] Take 25 mg by mouth daily.      furosemide (LASIX) 40 MG tablet [FUROSEMIDE (LASIX) 40 MG TABLET] Take 1.5 tablets (60 mg total) by mouth daily. 135 tablet 3    Lactobacillus rhamnosus GG (CULTURELLE) 10-15 Billion cell capsule [LACTOBACILLUS RHAMNOSUS GG (CULTURELLE) 10-15 BILLION CELL CAPSULE] Take 1 capsule by mouth daily.      losartan  (COZAAR) 100 MG tablet [LOSARTAN (COZAAR) 100 MG TABLET] TAKE 1 TABLET (100 MG      TOTAL) DAILY 90 tablet 3    magnesium oxide (MAG-OX) 400 mg tablet [MAGNESIUM OXIDE (MAG-OX) 400 MG TABLET] Take 400 mg by mouth daily.      melatonin 3 mg Tab tablet Take 10 mg by mouth nightly as needed      metoprolol succinate (TOPROL-XL) 50 MG 24 hr tablet [METOPROLOL SUCCINATE (TOPROL-XL) 50 MG 24 HR TABLET] Take 1 tablet (50 mg total) by mouth daily. 90 tablet 3    multivitamin with minerals (THERA-M) 9 mg iron-400 mcg Tab tablet [MULTIVITAMIN WITH MINERALS (THERA-M) 9 MG IRON-400 MCG TAB TABLET] Take 1 tablet by mouth daily.      naproxen sodium (ALEVE) 220 MG tablet [NAPROXEN SODIUM (ALEVE) 220 MG TABLET] Take 220 mg by mouth as needed for pain.      potassium chloride (MICRO-K) 10 mEq CR capsule [POTASSIUM CHLORIDE (MICRO-K) 10 MEQ CR CAPSULE] Take 10 mEq by mouth daily.       simvastatin (ZOCOR) 40 MG tablet [SIMVASTATIN (ZOCOR) 40 MG TABLET] Take 1 tablet (40 mg total) by mouth at bedtime. 90 tablet 3    SYNTHROID 125 mcg tablet Take 112 mcg by mouth daily      warfarin ANTICOAGULANT (COUMADIN/JANTOVEN) 2 MG tablet [WARFARIN ANTICOAGULANT (COUMADIN/JANTOVEN) 2 MG TABLET] Take 1 tablet (2 mg total) by mouth daily. Take 1 tablet (2 mg) by mouth daily. Adjust dose based on INR results as directed. (Patient taking differently: Take 2 mg by mouth daily 1mg on Mondays, Weds, Fridays. The there days 2mg.) 90 tablet 3    levothyroxine (SYNTHROID, LEVOTHROID) 150 MCG tablet [LEVOTHYROXINE (SYNTHROID, LEVOTHROID) 150 MCG TABLET] Take 150 mcg by mouth daily. (Patient not taking: Reported on 5/17/2022)        Allergies   Allergen Reactions    Align      Other reaction(s): Dizzy    Aspirin-Acetaminophen-Caffeine Unknown     ulcer    Cardizem [Diltiazem] Unknown     Stomach upset    Cephalexin      Other reaction(s): gas    Gabapentin Unknown    Hydrocodone-Acetaminophen Unknown     stomach upset    Nitrofurantoin      Other  reaction(s): gas    Sulfa (Sulfonamide Antibiotics) [Sulfa Antibiotics] Unknown    Trees Unknown         Lab Results    Chemistry/lipid CBC Cardiac Enzymes/BNP/TSH/INR   Recent Labs   Lab Test 06/08/23  1020 04/27/22  1107 11/17/21  1025   TRIG  --   --  171*   LDL  --   --  53   BUN 20.4   < > 19      < > 144   CO2 29   < > 29    < > = values in this interval not displayed.    No results for input(s): WBC, HGB, HCT, MCV, PLT in the last 17868 hours. Recent Labs   Lab Test 05/17/23  1347   TSH 0.91        A total 34 minutes was spent reviewing patient's medical records, obtaining history and performing examination, as well as discussing diagnoses/ recommendations with patient and answering all questions.                        Thank you for allowing me to participate in the care of your patient.      Sincerely,     Renetta Sosa MD     [facility]  cc:   Renetta Sosa MD  [address], [address]  [place] [address]

## 2023-10-09 NOTE — PROGRESS NOTES
Thank you, Kristyn Guaman CNP for asking the [facility] team to see Ms. Ayanna Argueta to follow-up sinus node dysfunction status post permanent pacemaker insertion.    Assessment/Recommendations   Assessment:    1.  Sinus node dysfunction, status post permanent pacemaker insertion with subsequent development of permanent atrial fibrillation.  Pacemaker check done today demonstrates normal device operation.  She has 4 years remaining on her battery.  We will continue with remote checks over the course of the year and a follow-up visit in the clinic in 1 years time.  2.  Chronic atrial fibrillation, rate controlled.  She remains on warfarin anticoagulation to minimize risks of thromboembolic events.  Given her neuropathy and need for a cane or walker, we may want to consider a Watchman device going forward, especially if her gait becomes more unstable.  This would allow her to come off anticoagulation, thus reducing the risks of a fall with intracerebral bleed.  3.  Aortic valve stenosis, status post TAVR at the [facility] in 2016.  Echocardiogram 1 year ago demonstrated normal prosthetic valve function.  4.  Nonobstructive coronary artery disease.  She reports no exertional chest discomfort.    Plan:  1.  Continue current medications  2.  Follow-up visit in 1 year in conjunction with her in clinic pacemaker check  3.  Consider evaluation for Watchman device implant if the patient's gait becomes more unsteady.       History of Present Illness    Ms. Ayanna Argueta is a 89 year old female with history of aortic valve stenosis status post TAVR in 2016, sick sinus syndrome status post permanent pacemaker insertion with subsequent development of chronic atrial fibrillation on warfarin anticoagulation, nonobstructive coronary artery disease who presents to the clinic today for her yearly follow-up.  From a cardiac standpoint, she feels she is doing well.  She denies any symptoms of chest discomfort,  "orthopnea, PND or lower extremity edema.  Has become slightly more unsteady on her feet as a result of peripheral neuropathy.  This has required her now to use a cane or walker with ambulation.    ECG (personally reviewed): No ECG today.  Device check was performed in conjunction with her visit demonstrating normal device operation.  No evidence of ventricular arrhythmias.  Lead and battery status stable.    Cardiac Imaging Studies (personally reviewed): No recent cardiac imaging     Physical Examination Review of Systems   BP 94/52 (BP Location: Right arm, Patient Position: Sitting, Cuff Size: Adult Regular)   Pulse 60   Resp 16   Ht 1.676 m (5' 6\")   Wt 63.7 kg (140 lb 6.4 oz)   SpO2 94%   BMI 22.66 kg/m    Body mass index is 22.66 kg/m .  Wt Readings from Last 3 Encounters:   10/09/23 63.7 kg (140 lb 6.4 oz)   05/17/22 65.8 kg (145 lb)   03/22/21 68.5 kg (151 lb)     General Appearance:   Awake, Alert, No acute distress.   HEENT:  No scleral icterus; the mucous membranes were pink and moist.   Neck: No cervical bruits or jugular venous distention    Chest: The spine was straight. The chest was symmetric.   Lungs:   Respirations unlabored; the lungs are clear to auscultation. No wheezing   Cardiovascular:   Irregularly irregular rhythm.  S1, S2 normal.  No murmur   Abdomen:  No organomegaly, masses, bruits, or tenderness. Bowels sounds are present   Extremities: No peripheral edema   Skin: No xanthelasma. Warm, Dry.   Musculoskeletal: No tenderness.   Neurologic: Mood and affect are appropriate.    Enc Vitals  BP: 94/52  Pulse: 60  Resp: 16  SpO2: 94 %  Weight: 63.7 kg (140 lb 6.4 oz)  Height: 167.6 cm (5' 6\")                                         Medical History  Surgical History Family History Social History   Past Medical History:   Diagnosis Date    Aortic valve disorder     Atrial fibrillation (H)     Syncope     Past Surgical History:   Procedure Laterality Date    CARDIAC VALVE REPLACEMENT  " 10/01/2016    TAVR    IMPLANT PACEMAKER      IR LUMBAR EPIDURAL STEROID INJECTION  10/19/2007    TRANSCATHETER AORTIC-VALVE REPLACEMENT      No family history on file. Social History     Socioeconomic History    Marital status:      Spouse name: Not on file    Number of children: Not on file    Years of education: Not on file    Highest education level: Not on file   Occupational History    Not on file   Tobacco Use    Smoking status: Never    Smokeless tobacco: Never   Substance and Sexual Activity    Alcohol use: Not on file    Drug use: Not on file    Sexual activity: Not on file   Other Topics Concern    Not on file   Social History Narrative    Not on file     Social Determinants of Health     Financial Resource Strain: Not on file   Food Insecurity: Not on file   Transportation Needs: Not on file   Physical Activity: Not on file   Stress: Not on file   Social Connections: Not on file   Interpersonal Safety: Not on file   Housing Stability: Not on file          Medications  Allergies   Current Outpatient Medications   Medication Sig Dispense Refill    amoxicillin (AMOXIL) 500 MG capsule 2000 mg before dental procedure      aspirin 81 MG EC tablet [ASPIRIN 81 MG EC TABLET] Take 81 mg by mouth daily.      calcium carbonate (OS-JIGAR) 600 mg (1,500 mg) tablet Take 600 mg by mouth daily      diphenhydrAMINE (BENADRYL) 25 mg capsule [DIPHENHYDRAMINE (BENADRYL) 25 MG CAPSULE] Take 25 mg by mouth daily.      furosemide (LASIX) 40 MG tablet [FUROSEMIDE (LASIX) 40 MG TABLET] Take 1.5 tablets (60 mg total) by mouth daily. 135 tablet 3    Lactobacillus rhamnosus GG (CULTURELLE) 10-15 Billion cell capsule [LACTOBACILLUS RHAMNOSUS GG (CULTURELLE) 10-15 BILLION CELL CAPSULE] Take 1 capsule by mouth daily.      losartan (COZAAR) 100 MG tablet [LOSARTAN (COZAAR) 100 MG TABLET] TAKE 1 TABLET (100 MG      TOTAL) DAILY 90 tablet 3    magnesium oxide (MAG-OX) 400 mg tablet [MAGNESIUM OXIDE (MAG-OX) 400 MG TABLET] Take 400 mg  by mouth daily.      melatonin 3 mg Tab tablet Take 10 mg by mouth nightly as needed      metoprolol succinate (TOPROL-XL) 50 MG 24 hr tablet [METOPROLOL SUCCINATE (TOPROL-XL) 50 MG 24 HR TABLET] Take 1 tablet (50 mg total) by mouth daily. 90 tablet 3    multivitamin with minerals (THERA-M) 9 mg iron-400 mcg Tab tablet [MULTIVITAMIN WITH MINERALS (THERA-M) 9 MG IRON-400 MCG TAB TABLET] Take 1 tablet by mouth daily.      naproxen sodium (ALEVE) 220 MG tablet [NAPROXEN SODIUM (ALEVE) 220 MG TABLET] Take 220 mg by mouth as needed for pain.      potassium chloride (MICRO-K) 10 mEq CR capsule [POTASSIUM CHLORIDE (MICRO-K) 10 MEQ CR CAPSULE] Take 10 mEq by mouth daily.       simvastatin (ZOCOR) 40 MG tablet [SIMVASTATIN (ZOCOR) 40 MG TABLET] Take 1 tablet (40 mg total) by mouth at bedtime. 90 tablet 3    SYNTHROID 125 mcg tablet Take 112 mcg by mouth daily      warfarin ANTICOAGULANT (COUMADIN/JANTOVEN) 2 MG tablet [WARFARIN ANTICOAGULANT (COUMADIN/JANTOVEN) 2 MG TABLET] Take 1 tablet (2 mg total) by mouth daily. Take 1 tablet (2 mg) by mouth daily. Adjust dose based on INR results as directed. (Patient taking differently: Take 2 mg by mouth daily 1mg on Mondays, Weds, Fridays. The there days 2mg.) 90 tablet 3    levothyroxine (SYNTHROID, LEVOTHROID) 150 MCG tablet [LEVOTHYROXINE (SYNTHROID, LEVOTHROID) 150 MCG TABLET] Take 150 mcg by mouth daily. (Patient not taking: Reported on 5/17/2022)        Allergies   Allergen Reactions    Align      Other reaction(s): Dizzy    Aspirin-Acetaminophen-Caffeine Unknown     ulcer    Cardizem [Diltiazem] Unknown     Stomach upset    Cephalexin      Other reaction(s): gas    Gabapentin Unknown    Hydrocodone-Acetaminophen Unknown     stomach upset    Nitrofurantoin      Other reaction(s): gas    Sulfa (Sulfonamide Antibiotics) [Sulfa Antibiotics] Unknown    Trees Unknown         Lab Results    Chemistry/lipid CBC Cardiac Enzymes/BNP/TSH/INR   Recent Labs   Lab Test 06/08/23  1020  04/27/22  1107 11/17/21  1025   TRIG  --   --  171*   LDL  --   --  53   BUN 20.4   < > 19      < > 144   CO2 29   < > 29    < > = values in this interval not displayed.    No results for input(s): WBC, HGB, HCT, MCV, PLT in the last 25560 hours. Recent Labs   Lab Test 05/17/23  1347   TSH 0.91        A total 34 minutes was spent reviewing patient's medical records, obtaining history and performing examination, as well as discussing diagnoses/ recommendations with patient and answering all questions.

## 2023-10-10 LAB
MDC_IDC_LEAD_CONNECTION_STATUS: NORMAL
MDC_IDC_LEAD_IMPLANT_DT: NORMAL
MDC_IDC_LEAD_LOCATION: NORMAL
MDC_IDC_LEAD_LOCATION_DETAIL_1: NORMAL
MDC_IDC_LEAD_MFG: NORMAL
MDC_IDC_LEAD_MODEL: NORMAL
MDC_IDC_LEAD_POLARITY_TYPE: NORMAL
MDC_IDC_LEAD_SERIAL: NORMAL
MDC_IDC_LEAD_SPECIAL_FUNCTION: NORMAL
MDC_IDC_MSMT_BATTERY_DTM: NORMAL
MDC_IDC_MSMT_BATTERY_REMAINING_LONGEVITY: 48 MO
MDC_IDC_MSMT_BATTERY_REMAINING_PERCENTAGE: 62 %
MDC_IDC_MSMT_BATTERY_STATUS: NORMAL
MDC_IDC_MSMT_LEADCHNL_RV_IMPEDANCE_VALUE: 530 OHM
MDC_IDC_MSMT_LEADCHNL_RV_IMPEDANCE_VALUE: 530 OHM
MDC_IDC_MSMT_LEADCHNL_RV_PACING_THRESHOLD_AMPLITUDE: 0.6 V
MDC_IDC_MSMT_LEADCHNL_RV_PACING_THRESHOLD_AMPLITUDE: 0.6 V
MDC_IDC_MSMT_LEADCHNL_RV_PACING_THRESHOLD_PULSEWIDTH: 0.4 MS
MDC_IDC_MSMT_LEADCHNL_RV_PACING_THRESHOLD_PULSEWIDTH: 0.5 MS
MDC_IDC_MSMT_LEADCHNL_RV_SENSING_INTR_AMPL: 15.8 MV
MDC_IDC_PG_IMPLANT_DTM: NORMAL
MDC_IDC_PG_MFG: NORMAL
MDC_IDC_PG_MODEL: NORMAL
MDC_IDC_PG_SERIAL: NORMAL
MDC_IDC_PG_TYPE: NORMAL
MDC_IDC_SESS_CLINIC_NAME: NORMAL
MDC_IDC_SESS_DTM: NORMAL
MDC_IDC_SESS_TYPE: NORMAL
MDC_IDC_SET_BRADY_LOWRATE: 60 {BEATS}/MIN
MDC_IDC_SET_BRADY_MAX_SENSOR_RATE: 120 {BEATS}/MIN
MDC_IDC_SET_BRADY_MODE: NORMAL
MDC_IDC_SET_LEADCHNL_RV_PACING_AMPLITUDE: 1.5 V
MDC_IDC_SET_LEADCHNL_RV_PACING_CAPTURE_MODE: NORMAL
MDC_IDC_SET_LEADCHNL_RV_PACING_POLARITY: NORMAL
MDC_IDC_SET_LEADCHNL_RV_PACING_PULSEWIDTH: 0.4 MS
MDC_IDC_SET_LEADCHNL_RV_SENSING_ADAPTATION_MODE: NORMAL
MDC_IDC_SET_LEADCHNL_RV_SENSING_POLARITY: NORMAL
MDC_IDC_SET_LEADCHNL_RV_SENSING_SENSITIVITY: 2.5 MV
MDC_IDC_SET_ZONE_DETECTION_INTERVAL: 375 MS
MDC_IDC_SET_ZONE_STATUS: NORMAL
MDC_IDC_SET_ZONE_TYPE: NORMAL
MDC_IDC_SET_ZONE_VENDOR_TYPE: NORMAL
MDC_IDC_STAT_BRADY_DTM_END: NORMAL
MDC_IDC_STAT_BRADY_DTM_START: NORMAL
MDC_IDC_STAT_BRADY_RV_PERCENT_PACED: 93 %
MDC_IDC_STAT_EPISODE_RECENT_COUNT: 0
MDC_IDC_STAT_EPISODE_RECENT_COUNT: 0
MDC_IDC_STAT_EPISODE_RECENT_COUNT_DTM_END: NORMAL
MDC_IDC_STAT_EPISODE_RECENT_COUNT_DTM_END: NORMAL
MDC_IDC_STAT_EPISODE_RECENT_COUNT_DTM_START: NORMAL
MDC_IDC_STAT_EPISODE_RECENT_COUNT_DTM_START: NORMAL
MDC_IDC_STAT_EPISODE_TYPE: NORMAL
MDC_IDC_STAT_EPISODE_TYPE: NORMAL
MDC_IDC_STAT_EPISODE_VENDOR_TYPE: NORMAL
MDC_IDC_STAT_EPISODE_VENDOR_TYPE: NORMAL

## 2023-12-21 ENCOUNTER — TRANSFERRED RECORDS (OUTPATIENT)
Dept: HEALTH INFORMATION MANAGEMENT | Facility: CLINIC | Age: 88
End: 2023-12-21

## 2023-12-21 ENCOUNTER — LAB REQUISITION (OUTPATIENT)
Dept: LAB | Facility: CLINIC | Age: 88
End: 2023-12-21
Payer: COMMERCIAL

## 2023-12-21 DIAGNOSIS — N18.32 CHRONIC KIDNEY DISEASE, STAGE 3B (H): ICD-10-CM

## 2023-12-21 PROCEDURE — 80048 BASIC METABOLIC PNL TOTAL CA: CPT | Mod: ORL | Performed by: NURSE PRACTITIONER

## 2023-12-22 LAB
ANION GAP SERPL CALCULATED.3IONS-SCNC: 11 MMOL/L (ref 7–15)
BUN SERPL-MCNC: 17.2 MG/DL (ref 8–23)
CALCIUM SERPL-MCNC: 9.8 MG/DL (ref 8.2–9.6)
CHLORIDE SERPL-SCNC: 100 MMOL/L (ref 98–107)
CREAT SERPL-MCNC: 1.21 MG/DL (ref 0.51–0.95)
DEPRECATED HCO3 PLAS-SCNC: 28 MMOL/L (ref 22–29)
EGFRCR SERPLBLD CKD-EPI 2021: 42 ML/MIN/1.73M2
GLUCOSE SERPL-MCNC: 118 MG/DL (ref 70–99)
POTASSIUM SERPL-SCNC: 3.9 MMOL/L (ref 3.4–5.3)
SODIUM SERPL-SCNC: 139 MMOL/L (ref 135–145)

## 2024-01-09 ENCOUNTER — ANCILLARY PROCEDURE (OUTPATIENT)
Dept: CARDIOLOGY | Facility: CLINIC | Age: 89
End: 2024-01-09
Attending: INTERNAL MEDICINE
Payer: COMMERCIAL

## 2024-01-09 DIAGNOSIS — I49.5 SICK SINUS SYNDROME (H): ICD-10-CM

## 2024-01-09 DIAGNOSIS — Z95.0 CARDIAC PACEMAKER IN SITU: ICD-10-CM

## 2024-01-09 LAB
MDC_IDC_EPISODE_DTM: NORMAL
MDC_IDC_EPISODE_ID: NORMAL
MDC_IDC_EPISODE_TYPE: NORMAL
MDC_IDC_LEAD_CONNECTION_STATUS: NORMAL
MDC_IDC_LEAD_IMPLANT_DT: NORMAL
MDC_IDC_LEAD_LOCATION: NORMAL
MDC_IDC_LEAD_LOCATION_DETAIL_1: NORMAL
MDC_IDC_LEAD_MFG: NORMAL
MDC_IDC_LEAD_MODEL: NORMAL
MDC_IDC_LEAD_POLARITY_TYPE: NORMAL
MDC_IDC_LEAD_SERIAL: NORMAL
MDC_IDC_LEAD_SPECIAL_FUNCTION: NORMAL
MDC_IDC_MSMT_BATTERY_DTM: NORMAL
MDC_IDC_MSMT_BATTERY_REMAINING_LONGEVITY: 48 MO
MDC_IDC_MSMT_BATTERY_REMAINING_PERCENTAGE: 57 %
MDC_IDC_MSMT_BATTERY_STATUS: NORMAL
MDC_IDC_MSMT_LEADCHNL_RV_IMPEDANCE_VALUE: 522 OHM
MDC_IDC_MSMT_LEADCHNL_RV_PACING_THRESHOLD_AMPLITUDE: 0.7 V
MDC_IDC_MSMT_LEADCHNL_RV_PACING_THRESHOLD_PULSEWIDTH: 0.4 MS
MDC_IDC_PG_IMPLANT_DTM: NORMAL
MDC_IDC_PG_MFG: NORMAL
MDC_IDC_PG_MODEL: NORMAL
MDC_IDC_PG_SERIAL: NORMAL
MDC_IDC_PG_TYPE: NORMAL
MDC_IDC_SESS_CLINIC_NAME: NORMAL
MDC_IDC_SESS_DTM: NORMAL
MDC_IDC_SESS_TYPE: NORMAL
MDC_IDC_SET_BRADY_LOWRATE: 60 {BEATS}/MIN
MDC_IDC_SET_BRADY_MAX_SENSOR_RATE: 120 {BEATS}/MIN
MDC_IDC_SET_BRADY_MODE: NORMAL
MDC_IDC_SET_LEADCHNL_RV_PACING_AMPLITUDE: 1.5 V
MDC_IDC_SET_LEADCHNL_RV_PACING_CAPTURE_MODE: NORMAL
MDC_IDC_SET_LEADCHNL_RV_PACING_POLARITY: NORMAL
MDC_IDC_SET_LEADCHNL_RV_PACING_PULSEWIDTH: 0.4 MS
MDC_IDC_SET_LEADCHNL_RV_SENSING_ADAPTATION_MODE: NORMAL
MDC_IDC_SET_LEADCHNL_RV_SENSING_POLARITY: NORMAL
MDC_IDC_SET_LEADCHNL_RV_SENSING_SENSITIVITY: 2.5 MV
MDC_IDC_SET_ZONE_DETECTION_INTERVAL: 375 MS
MDC_IDC_SET_ZONE_STATUS: NORMAL
MDC_IDC_SET_ZONE_TYPE: NORMAL
MDC_IDC_SET_ZONE_VENDOR_TYPE: NORMAL
MDC_IDC_STAT_BRADY_DTM_END: NORMAL
MDC_IDC_STAT_BRADY_DTM_START: NORMAL
MDC_IDC_STAT_BRADY_RV_PERCENT_PACED: 89 %
MDC_IDC_STAT_EPISODE_RECENT_COUNT: 0
MDC_IDC_STAT_EPISODE_RECENT_COUNT: 0
MDC_IDC_STAT_EPISODE_RECENT_COUNT_DTM_END: NORMAL
MDC_IDC_STAT_EPISODE_RECENT_COUNT_DTM_END: NORMAL
MDC_IDC_STAT_EPISODE_RECENT_COUNT_DTM_START: NORMAL
MDC_IDC_STAT_EPISODE_RECENT_COUNT_DTM_START: NORMAL
MDC_IDC_STAT_EPISODE_TYPE: NORMAL
MDC_IDC_STAT_EPISODE_TYPE: NORMAL
MDC_IDC_STAT_EPISODE_VENDOR_TYPE: NORMAL
MDC_IDC_STAT_EPISODE_VENDOR_TYPE: NORMAL

## 2024-01-09 PROCEDURE — 93296 REM INTERROG EVL PM/IDS: CPT | Performed by: INTERNAL MEDICINE

## 2024-01-09 PROCEDURE — 93294 REM INTERROG EVL PM/LDLS PM: CPT | Performed by: INTERNAL MEDICINE

## 2024-04-16 ENCOUNTER — ANCILLARY PROCEDURE (OUTPATIENT)
Dept: CARDIOLOGY | Facility: CLINIC | Age: 89
End: 2024-04-16
Attending: INTERNAL MEDICINE
Payer: COMMERCIAL

## 2024-04-16 DIAGNOSIS — I49.5 SICK SINUS SYNDROME (H): ICD-10-CM

## 2024-04-16 DIAGNOSIS — Z95.0 CARDIAC PACEMAKER IN SITU: ICD-10-CM

## 2024-04-18 LAB
MDC_IDC_EPISODE_DTM: NORMAL
MDC_IDC_EPISODE_DTM: NORMAL
MDC_IDC_EPISODE_ID: NORMAL
MDC_IDC_EPISODE_ID: NORMAL
MDC_IDC_EPISODE_TYPE: NORMAL
MDC_IDC_EPISODE_TYPE: NORMAL
MDC_IDC_LEAD_CONNECTION_STATUS: NORMAL
MDC_IDC_LEAD_IMPLANT_DT: NORMAL
MDC_IDC_LEAD_LOCATION: NORMAL
MDC_IDC_LEAD_LOCATION_DETAIL_1: NORMAL
MDC_IDC_LEAD_MFG: NORMAL
MDC_IDC_LEAD_MODEL: NORMAL
MDC_IDC_LEAD_POLARITY_TYPE: NORMAL
MDC_IDC_LEAD_SERIAL: NORMAL
MDC_IDC_LEAD_SPECIAL_FUNCTION: NORMAL
MDC_IDC_MSMT_BATTERY_DTM: NORMAL
MDC_IDC_MSMT_BATTERY_REMAINING_LONGEVITY: 42 MO
MDC_IDC_MSMT_BATTERY_REMAINING_PERCENTAGE: 51 %
MDC_IDC_MSMT_BATTERY_STATUS: NORMAL
MDC_IDC_MSMT_LEADCHNL_RV_IMPEDANCE_VALUE: 485 OHM
MDC_IDC_MSMT_LEADCHNL_RV_PACING_THRESHOLD_AMPLITUDE: 0.7 V
MDC_IDC_MSMT_LEADCHNL_RV_PACING_THRESHOLD_PULSEWIDTH: 0.4 MS
MDC_IDC_PG_IMPLANT_DTM: NORMAL
MDC_IDC_PG_MFG: NORMAL
MDC_IDC_PG_MODEL: NORMAL
MDC_IDC_PG_SERIAL: NORMAL
MDC_IDC_PG_TYPE: NORMAL
MDC_IDC_SESS_CLINIC_NAME: NORMAL
MDC_IDC_SESS_DTM: NORMAL
MDC_IDC_SESS_TYPE: NORMAL
MDC_IDC_SET_BRADY_LOWRATE: 60 {BEATS}/MIN
MDC_IDC_SET_BRADY_MAX_SENSOR_RATE: 120 {BEATS}/MIN
MDC_IDC_SET_BRADY_MODE: NORMAL
MDC_IDC_SET_LEADCHNL_RV_PACING_AMPLITUDE: 1.5 V
MDC_IDC_SET_LEADCHNL_RV_PACING_CAPTURE_MODE: NORMAL
MDC_IDC_SET_LEADCHNL_RV_PACING_POLARITY: NORMAL
MDC_IDC_SET_LEADCHNL_RV_PACING_PULSEWIDTH: 0.4 MS
MDC_IDC_SET_LEADCHNL_RV_SENSING_ADAPTATION_MODE: NORMAL
MDC_IDC_SET_LEADCHNL_RV_SENSING_POLARITY: NORMAL
MDC_IDC_SET_LEADCHNL_RV_SENSING_SENSITIVITY: 2.5 MV
MDC_IDC_SET_ZONE_DETECTION_INTERVAL: 375 MS
MDC_IDC_SET_ZONE_STATUS: NORMAL
MDC_IDC_SET_ZONE_TYPE: NORMAL
MDC_IDC_SET_ZONE_VENDOR_TYPE: NORMAL
MDC_IDC_STAT_BRADY_DTM_END: NORMAL
MDC_IDC_STAT_BRADY_DTM_START: NORMAL
MDC_IDC_STAT_BRADY_RV_PERCENT_PACED: 92 %
MDC_IDC_STAT_EPISODE_RECENT_COUNT: 0
MDC_IDC_STAT_EPISODE_RECENT_COUNT: 0
MDC_IDC_STAT_EPISODE_RECENT_COUNT_DTM_END: NORMAL
MDC_IDC_STAT_EPISODE_RECENT_COUNT_DTM_END: NORMAL
MDC_IDC_STAT_EPISODE_RECENT_COUNT_DTM_START: NORMAL
MDC_IDC_STAT_EPISODE_RECENT_COUNT_DTM_START: NORMAL
MDC_IDC_STAT_EPISODE_TYPE: NORMAL
MDC_IDC_STAT_EPISODE_TYPE: NORMAL
MDC_IDC_STAT_EPISODE_VENDOR_TYPE: NORMAL
MDC_IDC_STAT_EPISODE_VENDOR_TYPE: NORMAL

## 2024-04-18 PROCEDURE — 93296 REM INTERROG EVL PM/IDS: CPT | Performed by: INTERNAL MEDICINE

## 2024-04-18 PROCEDURE — 93294 REM INTERROG EVL PM/LDLS PM: CPT | Performed by: INTERNAL MEDICINE

## 2024-06-25 ENCOUNTER — APPOINTMENT (OUTPATIENT)
Dept: URBAN - METROPOLITAN AREA CLINIC 260 | Age: 89
Setting detail: DERMATOLOGY
End: 2024-06-25

## 2024-06-25 VITALS — HEIGHT: 65 IN | WEIGHT: 136 LBS | RESPIRATION RATE: 14 BRPM

## 2024-06-25 DIAGNOSIS — L82.1 OTHER SEBORRHEIC KERATOSIS: ICD-10-CM

## 2024-06-25 DIAGNOSIS — L30.4 ERYTHEMA INTERTRIGO: ICD-10-CM

## 2024-06-25 DIAGNOSIS — I78.8 OTHER DISEASES OF CAPILLARIES: ICD-10-CM

## 2024-06-25 PROCEDURE — OTHER REASSURANCE: OTHER

## 2024-06-25 PROCEDURE — OTHER COUNSELING: OTHER

## 2024-06-25 PROCEDURE — OTHER PHOTO-DOCUMENTATION: OTHER

## 2024-06-25 PROCEDURE — OTHER PRESCRIPTION: OTHER

## 2024-06-25 PROCEDURE — OTHER MIPS QUALITY: OTHER

## 2024-06-25 PROCEDURE — 99214 OFFICE O/P EST MOD 30 MIN: CPT

## 2024-06-25 RX ORDER — TRIAMCINOLONE ACETONIDE 1 MG/G
0.1% CREAM TOPICAL BID
Qty: 80 | Refills: 2 | Status: ERX | COMMUNITY
Start: 2024-06-25

## 2024-06-25 RX ORDER — KETOCONAZOLE 20 MG/G
2% CREAM TOPICAL BID
Qty: 60 | Refills: 2 | Status: ERX

## 2024-06-25 ASSESSMENT — LOCATION ZONE DERM
LOCATION ZONE: ARM
LOCATION ZONE: TRUNK

## 2024-06-25 ASSESSMENT — LOCATION SIMPLE DESCRIPTION DERM
LOCATION SIMPLE: UPPER BACK
LOCATION SIMPLE: RIGHT FOREARM
LOCATION SIMPLE: ABDOMEN

## 2024-06-25 ASSESSMENT — LOCATION DETAILED DESCRIPTION DERM
LOCATION DETAILED: RIGHT PROXIMAL DORSAL FOREARM
LOCATION DETAILED: SUPERIOR THORACIC SPINE
LOCATION DETAILED: SUBXIPHOID

## 2024-06-25 NOTE — HPI: SKIN LESION
Is This A New Presentation, Or A Follow-Up?: Skin Lesions
Additional History: The patient reports an irregular brown lesion on the right forearm, she noticed this 2 weeks ago and it is asymptomatic. She also reports a lesion on the upper back that is also asymptomatic, and a rash under the right breast that has been present for years. She thinks she has used clobetasol in the past prescribed by her physician in [place].

## 2024-06-25 NOTE — PROCEDURE: COUNSELING
Patient Specific Counseling (Will Not Stick From Patient To Patient): I recommended she obtain a talcum powder or baby powder to use once clear to keep the area dry.
Detail Level: Detailed
Detail Level: Simple

## 2024-07-29 ENCOUNTER — ANCILLARY PROCEDURE (OUTPATIENT)
Dept: CARDIOLOGY | Facility: CLINIC | Age: 89
End: 2024-07-29
Attending: INTERNAL MEDICINE
Payer: COMMERCIAL

## 2024-07-29 DIAGNOSIS — I49.5 SICK SINUS SYNDROME (H): ICD-10-CM

## 2024-07-29 DIAGNOSIS — Z95.0 CARDIAC PACEMAKER IN SITU: ICD-10-CM

## 2024-07-30 LAB
MDC_IDC_EPISODE_DTM: NORMAL
MDC_IDC_EPISODE_DTM: NORMAL
MDC_IDC_EPISODE_ID: NORMAL
MDC_IDC_EPISODE_ID: NORMAL
MDC_IDC_EPISODE_TYPE: NORMAL
MDC_IDC_EPISODE_TYPE: NORMAL
MDC_IDC_LEAD_CONNECTION_STATUS: NORMAL
MDC_IDC_LEAD_IMPLANT_DT: NORMAL
MDC_IDC_LEAD_LOCATION: NORMAL
MDC_IDC_LEAD_LOCATION_DETAIL_1: NORMAL
MDC_IDC_LEAD_MFG: NORMAL
MDC_IDC_LEAD_MODEL: NORMAL
MDC_IDC_LEAD_POLARITY_TYPE: NORMAL
MDC_IDC_LEAD_SERIAL: NORMAL
MDC_IDC_LEAD_SPECIAL_FUNCTION: NORMAL
MDC_IDC_MSMT_BATTERY_DTM: NORMAL
MDC_IDC_MSMT_BATTERY_REMAINING_LONGEVITY: 36 MO
MDC_IDC_MSMT_BATTERY_REMAINING_PERCENTAGE: 45 %
MDC_IDC_MSMT_BATTERY_STATUS: NORMAL
MDC_IDC_MSMT_LEADCHNL_RV_IMPEDANCE_VALUE: 504 OHM
MDC_IDC_MSMT_LEADCHNL_RV_PACING_THRESHOLD_AMPLITUDE: 0.7 V
MDC_IDC_MSMT_LEADCHNL_RV_PACING_THRESHOLD_PULSEWIDTH: 0.4 MS
MDC_IDC_PG_IMPLANT_DTM: NORMAL
MDC_IDC_PG_MFG: NORMAL
MDC_IDC_PG_MODEL: NORMAL
MDC_IDC_PG_SERIAL: NORMAL
MDC_IDC_PG_TYPE: NORMAL
MDC_IDC_SESS_CLINIC_NAME: NORMAL
MDC_IDC_SESS_DTM: NORMAL
MDC_IDC_SESS_TYPE: NORMAL
MDC_IDC_SET_BRADY_LOWRATE: 60 {BEATS}/MIN
MDC_IDC_SET_BRADY_MAX_SENSOR_RATE: 120 {BEATS}/MIN
MDC_IDC_SET_BRADY_MODE: NORMAL
MDC_IDC_SET_LEADCHNL_RV_PACING_AMPLITUDE: 1.5 V
MDC_IDC_SET_LEADCHNL_RV_PACING_CAPTURE_MODE: NORMAL
MDC_IDC_SET_LEADCHNL_RV_PACING_POLARITY: NORMAL
MDC_IDC_SET_LEADCHNL_RV_PACING_PULSEWIDTH: 0.4 MS
MDC_IDC_SET_LEADCHNL_RV_SENSING_ADAPTATION_MODE: NORMAL
MDC_IDC_SET_LEADCHNL_RV_SENSING_POLARITY: NORMAL
MDC_IDC_SET_LEADCHNL_RV_SENSING_SENSITIVITY: 2.5 MV
MDC_IDC_SET_ZONE_DETECTION_INTERVAL: 375 MS
MDC_IDC_SET_ZONE_STATUS: NORMAL
MDC_IDC_SET_ZONE_TYPE: NORMAL
MDC_IDC_SET_ZONE_VENDOR_TYPE: NORMAL
MDC_IDC_STAT_BRADY_DTM_END: NORMAL
MDC_IDC_STAT_BRADY_DTM_START: NORMAL
MDC_IDC_STAT_BRADY_RV_PERCENT_PACED: 94 %
MDC_IDC_STAT_EPISODE_RECENT_COUNT: 0
MDC_IDC_STAT_EPISODE_RECENT_COUNT: 0
MDC_IDC_STAT_EPISODE_RECENT_COUNT_DTM_END: NORMAL
MDC_IDC_STAT_EPISODE_RECENT_COUNT_DTM_END: NORMAL
MDC_IDC_STAT_EPISODE_RECENT_COUNT_DTM_START: NORMAL
MDC_IDC_STAT_EPISODE_RECENT_COUNT_DTM_START: NORMAL
MDC_IDC_STAT_EPISODE_TYPE: NORMAL
MDC_IDC_STAT_EPISODE_TYPE: NORMAL
MDC_IDC_STAT_EPISODE_VENDOR_TYPE: NORMAL
MDC_IDC_STAT_EPISODE_VENDOR_TYPE: NORMAL

## 2024-07-30 PROCEDURE — 93296 REM INTERROG EVL PM/IDS: CPT | Performed by: INTERNAL MEDICINE

## 2024-07-30 PROCEDURE — 93294 REM INTERROG EVL PM/LDLS PM: CPT | Performed by: INTERNAL MEDICINE

## 2024-08-01 ENCOUNTER — LAB REQUISITION (OUTPATIENT)
Dept: LAB | Facility: CLINIC | Age: 89
End: 2024-08-01
Payer: COMMERCIAL

## 2024-08-01 ENCOUNTER — TRANSFERRED RECORDS (OUTPATIENT)
Dept: HEALTH INFORMATION MANAGEMENT | Facility: CLINIC | Age: 89
End: 2024-08-01
Payer: COMMERCIAL

## 2024-08-01 DIAGNOSIS — I10 ESSENTIAL (PRIMARY) HYPERTENSION: ICD-10-CM

## 2024-08-01 DIAGNOSIS — E03.9 HYPOTHYROIDISM, UNSPECIFIED: ICD-10-CM

## 2024-08-01 DIAGNOSIS — E83.42 HYPOMAGNESEMIA: ICD-10-CM

## 2024-08-01 PROCEDURE — 84439 ASSAY OF FREE THYROXINE: CPT | Mod: ORL | Performed by: NURSE PRACTITIONER

## 2024-08-01 PROCEDURE — 83735 ASSAY OF MAGNESIUM: CPT | Mod: ORL | Performed by: NURSE PRACTITIONER

## 2024-08-01 PROCEDURE — 84443 ASSAY THYROID STIM HORMONE: CPT | Mod: ORL | Performed by: NURSE PRACTITIONER

## 2024-08-01 PROCEDURE — 80048 BASIC METABOLIC PNL TOTAL CA: CPT | Mod: ORL | Performed by: NURSE PRACTITIONER

## 2024-08-02 LAB
ANION GAP SERPL CALCULATED.3IONS-SCNC: 10 MMOL/L (ref 7–15)
BUN SERPL-MCNC: 16.2 MG/DL (ref 8–23)
CALCIUM SERPL-MCNC: 9.7 MG/DL (ref 8.8–10.4)
CHLORIDE SERPL-SCNC: 101 MMOL/L (ref 98–107)
CREAT SERPL-MCNC: 1.44 MG/DL (ref 0.51–0.95)
EGFRCR SERPLBLD CKD-EPI 2021: 34 ML/MIN/1.73M2
GLUCOSE SERPL-MCNC: 94 MG/DL (ref 70–99)
HCO3 SERPL-SCNC: 29 MMOL/L (ref 22–29)
MAGNESIUM SERPL-MCNC: 2.5 MG/DL (ref 1.7–2.3)
POTASSIUM SERPL-SCNC: 4.5 MMOL/L (ref 3.4–5.3)
SODIUM SERPL-SCNC: 140 MMOL/L (ref 135–145)
T4 FREE SERPL-MCNC: 1.77 NG/DL (ref 0.9–1.7)
TSH SERPL DL<=0.005 MIU/L-ACNC: 0.76 UIU/ML (ref 0.3–4.2)

## 2024-08-06 ENCOUNTER — OFFICE VISIT (OUTPATIENT)
Dept: CARDIOLOGY | Facility: CLINIC | Age: 89
End: 2024-08-06
Payer: COMMERCIAL

## 2024-08-06 ENCOUNTER — ANCILLARY PROCEDURE (OUTPATIENT)
Dept: CARDIOLOGY | Facility: CLINIC | Age: 89
End: 2024-08-06
Attending: INTERNAL MEDICINE
Payer: COMMERCIAL

## 2024-08-06 VITALS
DIASTOLIC BLOOD PRESSURE: 71 MMHG | OXYGEN SATURATION: 98 % | HEART RATE: 60 BPM | WEIGHT: 136 LBS | SYSTOLIC BLOOD PRESSURE: 111 MMHG | BODY MASS INDEX: 21.95 KG/M2

## 2024-08-06 DIAGNOSIS — I49.5 SICK SINUS SYNDROME (H): ICD-10-CM

## 2024-08-06 DIAGNOSIS — I25.10 CORONARY ARTERY DISEASE INVOLVING NATIVE CORONARY ARTERY OF NATIVE HEART WITHOUT ANGINA PECTORIS: ICD-10-CM

## 2024-08-06 DIAGNOSIS — I49.5 SICK SINUS SYNDROME (H): Primary | ICD-10-CM

## 2024-08-06 DIAGNOSIS — Z95.2 S/P TAVR (TRANSCATHETER AORTIC VALVE REPLACEMENT): ICD-10-CM

## 2024-08-06 DIAGNOSIS — Z95.0 CARDIAC PACEMAKER IN SITU: ICD-10-CM

## 2024-08-06 LAB
MDC_IDC_EPISODE_DTM: NORMAL
MDC_IDC_EPISODE_ID: NORMAL
MDC_IDC_EPISODE_TYPE: NORMAL
MDC_IDC_LEAD_CONNECTION_STATUS: NORMAL
MDC_IDC_LEAD_IMPLANT_DT: NORMAL
MDC_IDC_LEAD_LOCATION: NORMAL
MDC_IDC_LEAD_LOCATION_DETAIL_1: NORMAL
MDC_IDC_LEAD_MFG: NORMAL
MDC_IDC_LEAD_MODEL: NORMAL
MDC_IDC_LEAD_POLARITY_TYPE: NORMAL
MDC_IDC_LEAD_SERIAL: NORMAL
MDC_IDC_LEAD_SPECIAL_FUNCTION: NORMAL
MDC_IDC_MSMT_BATTERY_DTM: NORMAL
MDC_IDC_MSMT_BATTERY_REMAINING_LONGEVITY: 36 MO
MDC_IDC_MSMT_BATTERY_REMAINING_PERCENTAGE: 46 %
MDC_IDC_MSMT_BATTERY_STATUS: NORMAL
MDC_IDC_MSMT_LEADCHNL_RV_IMPEDANCE_VALUE: 493 OHM
MDC_IDC_MSMT_LEADCHNL_RV_PACING_THRESHOLD_AMPLITUDE: 0.5 V
MDC_IDC_MSMT_LEADCHNL_RV_PACING_THRESHOLD_PULSEWIDTH: 0.4 MS
MDC_IDC_PG_IMPLANT_DTM: NORMAL
MDC_IDC_PG_MFG: NORMAL
MDC_IDC_PG_MODEL: NORMAL
MDC_IDC_PG_SERIAL: NORMAL
MDC_IDC_PG_TYPE: NORMAL
MDC_IDC_SESS_CLINIC_NAME: NORMAL
MDC_IDC_SESS_DTM: NORMAL
MDC_IDC_SESS_TYPE: NORMAL
MDC_IDC_SET_BRADY_LOWRATE: 60 {BEATS}/MIN
MDC_IDC_SET_BRADY_MAX_SENSOR_RATE: 120 {BEATS}/MIN
MDC_IDC_SET_BRADY_MODE: NORMAL
MDC_IDC_SET_LEADCHNL_RV_PACING_AMPLITUDE: 1.5 V
MDC_IDC_SET_LEADCHNL_RV_PACING_CAPTURE_MODE: NORMAL
MDC_IDC_SET_LEADCHNL_RV_PACING_POLARITY: NORMAL
MDC_IDC_SET_LEADCHNL_RV_PACING_PULSEWIDTH: 0.4 MS
MDC_IDC_SET_LEADCHNL_RV_SENSING_ADAPTATION_MODE: NORMAL
MDC_IDC_SET_LEADCHNL_RV_SENSING_POLARITY: NORMAL
MDC_IDC_SET_LEADCHNL_RV_SENSING_SENSITIVITY: 2.5 MV
MDC_IDC_SET_ZONE_DETECTION_INTERVAL: 375 MS
MDC_IDC_SET_ZONE_STATUS: NORMAL
MDC_IDC_SET_ZONE_TYPE: NORMAL
MDC_IDC_SET_ZONE_VENDOR_TYPE: NORMAL
MDC_IDC_STAT_BRADY_DTM_END: NORMAL
MDC_IDC_STAT_BRADY_DTM_START: NORMAL
MDC_IDC_STAT_BRADY_RV_PERCENT_PACED: 94 %
MDC_IDC_STAT_EPISODE_RECENT_COUNT: 0
MDC_IDC_STAT_EPISODE_RECENT_COUNT: 0
MDC_IDC_STAT_EPISODE_RECENT_COUNT_DTM_END: NORMAL
MDC_IDC_STAT_EPISODE_RECENT_COUNT_DTM_END: NORMAL
MDC_IDC_STAT_EPISODE_RECENT_COUNT_DTM_START: NORMAL
MDC_IDC_STAT_EPISODE_RECENT_COUNT_DTM_START: NORMAL
MDC_IDC_STAT_EPISODE_TYPE: NORMAL
MDC_IDC_STAT_EPISODE_TYPE: NORMAL
MDC_IDC_STAT_EPISODE_VENDOR_TYPE: NORMAL
MDC_IDC_STAT_EPISODE_VENDOR_TYPE: NORMAL

## 2024-08-06 PROCEDURE — 99215 OFFICE O/P EST HI 40 MIN: CPT | Performed by: INTERNAL MEDICINE

## 2024-08-06 PROCEDURE — 93279 PRGRMG DEV EVAL PM/LDLS PM: CPT | Performed by: INTERNAL MEDICINE

## 2024-08-06 NOTE — PROGRESS NOTES
Thank you, Kristyn Gauman CNP, for asking the [facility] team to see Ms. Ayanna Argueta to follow-up on sinus node dysfunction status post permanent pacemaker insertion, paroxysmal A-fib, aortic valve stenosis status post TAVR.    Assessment/Recommendations   Assessment:    1.  Sick sinus syndrome status post permanent pacemaker insertion in October 2016 with device check today demonstrating normal device function.  She has 3 years longevity on her battery.  Leads are stable.  No evidence of any ventricular arrhythmias.  2.  Chronic atrial fibrillation, rate controlled.  She remains on warfarin anticoagulation to minimize risk of thromboembolic events.  Reports no significant falls.  3.  Aortic valve stenosis, status post TAVR at [facility] in October 2016.  Her last echocardiogram in 2022 demonstrated normal aortic valve function.  Recommended repeat echocardiogram.  4.  Nonobstructive coronary artery disease.  She reports no exertional chest discomfort although activity limited by severe hip pain.  She is contemplating hip replacement surgery and asked my opinion.  I told her I would pursue a nuclear stress study to verify no ischemia.  If this and the echocardiogram are unremarkable, I would see no cardiac contraindication to doing so but also stress that there may be other complications that can occur as well as prolonged recovery.    Plan:  1.  Continue current medications  2.  Schedule echocardiogram to follow-up on TAVR valve  3.  Consider pharmacologic nuclear stress test if she decides on pursuing hip replacement surgery  4.  Advise discussion with primary care to see if she might be a candidate for gabapentin as she reports peripheral neuropathy as well as her hip issue and is uncertain whether the pain is from her neuropathy versus her hip.       History of Present Illness    Ms. Ayanna Argueta is a 89 year old female with history of sinus node dysfunction, status post permanent  pacemaker insertion with subsequent development of chronic atrial fibrillation on warfarin anticoagulation, aortic valve stenosis status post TAVR in 2016 at the [facility] with finding of mild nonobstructive coronary artery disease on preoperative angiography who presents today for follow-up visit.  States she is doing well from a heart standpoint with no complaints of chest discomfort or shortness of breath although activity quite limited because of severe hip pain.  Also reports peripheral neuropathy which may also be contributing to the pain and is contemplating on whether to have hip replacement surgery.  Has had no major falls.  Denies any chest tightness when walking the halls of her apartment complex or any significant shortness of breath.  No lightheadedness.    ECG (personally reviewed): No ECG today.  Pacemaker check was performed in conjunction with her visit and is reported separately.    Cardiac Imaging Studies (personally reviewed): No recent cardiac imaging     Physical Examination Review of Systems   /71 (BP Location: Left arm, Patient Position: Sitting, Cuff Size: Adult Regular)   Pulse 60   Wt 61.7 kg (136 lb)   SpO2 98%   BMI 21.95 kg/m    Body mass index is 21.95 kg/m .  Wt Readings from Last 3 Encounters:   08/06/24 61.7 kg (136 lb)   10/09/23 63.7 kg (140 lb 6.4 oz)   05/17/22 65.8 kg (145 lb)     General Appearance:   Awake, Alert, No acute distress.   HEENT:  No scleral icterus; the mucous membranes were pink and moist.   Neck: No cervical bruits or jugular venous distention    Chest: The spine was straight. The chest was symmetric.   Lungs:   Respirations unlabored; the lungs are clear to auscultation. No wheezing   Cardiovascular:   Regular rate and rhythm.  S1, S2 normal.  No murmur or gallop   Abdomen:  No organomegaly, masses, bruits, or tenderness. Bowels sounds are present   Extremities: No peripheral edema bilaterally   Skin: No xanthelasma. Warm, Dry.   Musculoskeletal:  No tenderness.   Neurologic: Mood and affect are appropriate.    Enc Vitals  BP: 111/71  Pulse: 60  SpO2: 98 %  Weight: 61.7 kg (136 lb)                                         Medical History  Surgical History Family History Social History   Past Medical History:   Diagnosis Date    Aortic valve disorder     Atrial fibrillation (H)     Syncope     Past Surgical History:   Procedure Laterality Date    CARDIAC VALVE REPLACEMENT  10/01/2016    TAVR    IMPLANT PACEMAKER      IR LUMBAR EPIDURAL STEROID INJECTION  10/19/2007    TRANSCATHETER AORTIC-VALVE REPLACEMENT      No family history on file. Social History     Socioeconomic History    Marital status:      Spouse name: Not on file    Number of children: Not on file    Years of education: Not on file    Highest education level: Not on file   Occupational History    Not on file   Tobacco Use    Smoking status: Never    Smokeless tobacco: Never   Substance and Sexual Activity    Alcohol use: Not on file    Drug use: Not on file    Sexual activity: Not on file   Other Topics Concern    Not on file   Social History Narrative    Not on file     Social Determinants of Health     Financial Resource Strain: Not on file   Food Insecurity: Not on file   Transportation Needs: Not on file   Physical Activity: Not on file   Stress: Not on file   Social Connections: Not on file   Interpersonal Safety: Not on file   Housing Stability: Not on file          Medications  Allergies   Current Outpatient Medications   Medication Sig Dispense Refill    amoxicillin (AMOXIL) 500 MG capsule 2000 mg before dental procedure      aspirin 81 MG EC tablet [ASPIRIN 81 MG EC TABLET] Take 81 mg by mouth daily.      calcium carbonate (OS-JIGAR) 600 mg (1,500 mg) tablet Take 600 mg by mouth daily      diphenhydrAMINE (BENADRYL) 25 mg capsule [DIPHENHYDRAMINE (BENADRYL) 25 MG CAPSULE] Take 25 mg by mouth daily.      furosemide (LASIX) 40 MG tablet [FUROSEMIDE (LASIX) 40 MG TABLET] Take 1.5 tablets  (60 mg total) by mouth daily. 135 tablet 3    Lactobacillus rhamnosus GG (CULTURELLE) 10-15 Billion cell capsule [LACTOBACILLUS RHAMNOSUS GG (CULTURELLE) 10-15 BILLION CELL CAPSULE] Take 1 capsule by mouth daily.      losartan (COZAAR) 100 MG tablet [LOSARTAN (COZAAR) 100 MG TABLET] TAKE 1 TABLET (100 MG      TOTAL) DAILY 90 tablet 3    magnesium oxide (MAG-OX) 400 mg tablet [MAGNESIUM OXIDE (MAG-OX) 400 MG TABLET] Take 400 mg by mouth daily.      melatonin 3 mg Tab tablet Take 10 mg by mouth nightly as needed      metoprolol succinate (TOPROL-XL) 50 MG 24 hr tablet [METOPROLOL SUCCINATE (TOPROL-XL) 50 MG 24 HR TABLET] Take 1 tablet (50 mg total) by mouth daily. 90 tablet 3    multivitamin with minerals (THERA-M) 9 mg iron-400 mcg Tab tablet [MULTIVITAMIN WITH MINERALS (THERA-M) 9 MG IRON-400 MCG TAB TABLET] Take 1 tablet by mouth daily.      naproxen sodium (ALEVE) 220 MG tablet [NAPROXEN SODIUM (ALEVE) 220 MG TABLET] Take 220 mg by mouth as needed for pain.      potassium chloride (MICRO-K) 10 mEq CR capsule [POTASSIUM CHLORIDE (MICRO-K) 10 MEQ CR CAPSULE] Take 10 mEq by mouth daily.       simvastatin (ZOCOR) 40 MG tablet [SIMVASTATIN (ZOCOR) 40 MG TABLET] Take 1 tablet (40 mg total) by mouth at bedtime. 90 tablet 3    SYNTHROID 125 mcg tablet Take 112 mcg by mouth daily      warfarin ANTICOAGULANT (COUMADIN/JANTOVEN) 2 MG tablet [WARFARIN ANTICOAGULANT (COUMADIN/JANTOVEN) 2 MG TABLET] Take 1 tablet (2 mg total) by mouth daily. Take 1 tablet (2 mg) by mouth daily. Adjust dose based on INR results as directed. (Patient taking differently: Take 2 mg by mouth daily 1mg on Mondays, Weds, Fridays. The there days 2mg.) 90 tablet 3    levothyroxine (SYNTHROID, LEVOTHROID) 150 MCG tablet [LEVOTHYROXINE (SYNTHROID, LEVOTHROID) 150 MCG TABLET] Take 150 mcg by mouth daily. (Patient not taking: Reported on 5/17/2022)        Allergies   Allergen Reactions    Align      Other reaction(s): Dizzy    Aspirin-Acetaminophen-Caffeine  "Unknown     ulcer    Cardizem [Diltiazem] Unknown     Stomach upset    Cephalexin      Other reaction(s): gas    Gabapentin Unknown    Hydrocodone-Acetaminophen Unknown     stomach upset    Nitrofurantoin      Other reaction(s): gas    Sulfa (Sulfonamide Antibiotics) [Sulfa Antibiotics] Unknown    Trees Unknown         Lab Results    Chemistry/lipid CBC Cardiac Enzymes/BNP/TSH/INR   Recent Labs   Lab Test 08/01/24  0958 04/27/22  1107 11/17/21  1025   TRIG  --   --  171*   LDL  --   --  53   BUN 16.2   < > 19      < > 144   CO2 29   < > 29    < > = values in this interval not displayed.    No results for input(s): \"WBC\", \"HGB\", \"HCT\", \"MCV\", \"PLT\" in the last 53087 hours. Recent Labs   Lab Test 08/01/24  0958   TSH 0.76        A total of 45 minutes was spent reviewing patient's medical records, obtaining history and performing examination, as well as discussing diagnoses/ recommendations with patient and answering all questions.                      "

## 2024-08-06 NOTE — LETTER
8/6/2024    FENG Katz CNP  [address] [place] [address]    RE: Ayanna Argueta       Dear Colleague,     I had the pleasure of seeing Ayanna Argueta in the [facility].      Thank you, Kristyn Guaman CNP, for asking the [facility] team to see Ms. Ayanna Argueta to follow-up on sinus node dysfunction status post permanent pacemaker insertion, paroxysmal A-fib, aortic valve stenosis status post TAVR.    Assessment/Recommendations   Assessment:    1.  Sick sinus syndrome status post permanent pacemaker insertion in October 2016 with device check today demonstrating normal device function.  She has 3 years longevity on her battery.  Leads are stable.  No evidence of any ventricular arrhythmias.  2.  Chronic atrial fibrillation, rate controlled.  She remains on warfarin anticoagulation to minimize risk of thromboembolic events.  Reports no significant falls.  3.  Aortic valve stenosis, status post TAVR at [facility] in October 2016.  Her last echocardiogram in 2022 demonstrated normal aortic valve function.  Recommended repeat echocardiogram.  4.  Nonobstructive coronary artery disease.  She reports no exertional chest discomfort although activity limited by severe hip pain.  She is contemplating hip replacement surgery and asked my opinion.  I told her I would pursue a nuclear stress study to verify no ischemia.  If this and the echocardiogram are unremarkable, I would see no cardiac contraindication to doing so but also stress that there may be other complications that can occur as well as prolonged recovery.    Plan:  1.  Continue current medications  2.  Schedule echocardiogram to follow-up on TAVR valve  3.  Consider pharmacologic nuclear stress test if she decides on pursuing hip replacement surgery  4.  Advise discussion with primary care to see if she might be a candidate for gabapentin as she reports peripheral neuropathy as well as her hip issue and is  uncertain whether the pain is from her neuropathy versus her hip.       History of Present Illness    Ms. Ayanna Argueta is a 89 year old female with history of sinus node dysfunction, status post permanent pacemaker insertion with subsequent development of chronic atrial fibrillation on warfarin anticoagulation, aortic valve stenosis status post TAVR in 2016 at the [facility] with finding of mild nonobstructive coronary artery disease on preoperative angiography who presents today for follow-up visit.  States she is doing well from a heart standpoint with no complaints of chest discomfort or shortness of breath although activity quite limited because of severe hip pain.  Also reports peripheral neuropathy which may also be contributing to the pain and is contemplating on whether to have hip replacement surgery.  Has had no major falls.  Denies any chest tightness when walking the halls of her apartment complex or any significant shortness of breath.  No lightheadedness.    ECG (personally reviewed): No ECG today.  Pacemaker check was performed in conjunction with her visit and is reported separately.    Cardiac Imaging Studies (personally reviewed): No recent cardiac imaging     Physical Examination Review of Systems   /71 (BP Location: Left arm, Patient Position: Sitting, Cuff Size: Adult Regular)   Pulse 60   Wt 61.7 kg (136 lb)   SpO2 98%   BMI 21.95 kg/m    Body mass index is 21.95 kg/m .  Wt Readings from Last 3 Encounters:   08/06/24 61.7 kg (136 lb)   10/09/23 63.7 kg (140 lb 6.4 oz)   05/17/22 65.8 kg (145 lb)     General Appearance:   Awake, Alert, No acute distress.   HEENT:  No scleral icterus; the mucous membranes were pink and moist.   Neck: No cervical bruits or jugular venous distention    Chest: The spine was straight. The chest was symmetric.   Lungs:   Respirations unlabored; the lungs are clear to auscultation. No wheezing   Cardiovascular:   Regular rate and rhythm.  S1, S2 normal.  No  murmur or gallop   Abdomen:  No organomegaly, masses, bruits, or tenderness. Bowels sounds are present   Extremities: No peripheral edema bilaterally   Skin: No xanthelasma. Warm, Dry.   Musculoskeletal: No tenderness.   Neurologic: Mood and affect are appropriate.    Enc Vitals  BP: 111/71  Pulse: 60  SpO2: 98 %  Weight: 61.7 kg (136 lb)                                         Medical History  Surgical History Family History Social History   Past Medical History:   Diagnosis Date     Aortic valve disorder      Atrial fibrillation (H)      Syncope     Past Surgical History:   Procedure Laterality Date     CARDIAC VALVE REPLACEMENT  10/01/2016    TAVR     IMPLANT PACEMAKER       IR LUMBAR EPIDURAL STEROID INJECTION  10/19/2007     TRANSCATHETER AORTIC-VALVE REPLACEMENT      No family history on file. Social History     Socioeconomic History     Marital status:      Spouse name: Not on file     Number of children: Not on file     Years of education: Not on file     Highest education level: Not on file   Occupational History     Not on file   Tobacco Use     Smoking status: Never     Smokeless tobacco: Never   Substance and Sexual Activity     Alcohol use: Not on file     Drug use: Not on file     Sexual activity: Not on file   Other Topics Concern     Not on file   Social History Narrative     Not on file     Social Determinants of Health     Financial Resource Strain: Not on file   Food Insecurity: Not on file   Transportation Needs: Not on file   Physical Activity: Not on file   Stress: Not on file   Social Connections: Not on file   Interpersonal Safety: Not on file   Housing Stability: Not on file          Medications  Allergies   Current Outpatient Medications   Medication Sig Dispense Refill     amoxicillin (AMOXIL) 500 MG capsule 2000 mg before dental procedure       aspirin 81 MG EC tablet [ASPIRIN 81 MG EC TABLET] Take 81 mg by mouth daily.       calcium carbonate (OS-JIGAR) 600 mg (1,500 mg) tablet Take  600 mg by mouth daily       diphenhydrAMINE (BENADRYL) 25 mg capsule [DIPHENHYDRAMINE (BENADRYL) 25 MG CAPSULE] Take 25 mg by mouth daily.       furosemide (LASIX) 40 MG tablet [FUROSEMIDE (LASIX) 40 MG TABLET] Take 1.5 tablets (60 mg total) by mouth daily. 135 tablet 3     Lactobacillus rhamnosus GG (CULTURELLE) 10-15 Billion cell capsule [LACTOBACILLUS RHAMNOSUS GG (CULTURELLE) 10-15 BILLION CELL CAPSULE] Take 1 capsule by mouth daily.       losartan (COZAAR) 100 MG tablet [LOSARTAN (COZAAR) 100 MG TABLET] TAKE 1 TABLET (100 MG      TOTAL) DAILY 90 tablet 3     magnesium oxide (MAG-OX) 400 mg tablet [MAGNESIUM OXIDE (MAG-OX) 400 MG TABLET] Take 400 mg by mouth daily.       melatonin 3 mg Tab tablet Take 10 mg by mouth nightly as needed       metoprolol succinate (TOPROL-XL) 50 MG 24 hr tablet [METOPROLOL SUCCINATE (TOPROL-XL) 50 MG 24 HR TABLET] Take 1 tablet (50 mg total) by mouth daily. 90 tablet 3     multivitamin with minerals (THERA-M) 9 mg iron-400 mcg Tab tablet [MULTIVITAMIN WITH MINERALS (THERA-M) 9 MG IRON-400 MCG TAB TABLET] Take 1 tablet by mouth daily.       naproxen sodium (ALEVE) 220 MG tablet [NAPROXEN SODIUM (ALEVE) 220 MG TABLET] Take 220 mg by mouth as needed for pain.       potassium chloride (MICRO-K) 10 mEq CR capsule [POTASSIUM CHLORIDE (MICRO-K) 10 MEQ CR CAPSULE] Take 10 mEq by mouth daily.        simvastatin (ZOCOR) 40 MG tablet [SIMVASTATIN (ZOCOR) 40 MG TABLET] Take 1 tablet (40 mg total) by mouth at bedtime. 90 tablet 3     SYNTHROID 125 mcg tablet Take 112 mcg by mouth daily       warfarin ANTICOAGULANT (COUMADIN/JANTOVEN) 2 MG tablet [WARFARIN ANTICOAGULANT (COUMADIN/JANTOVEN) 2 MG TABLET] Take 1 tablet (2 mg total) by mouth daily. Take 1 tablet (2 mg) by mouth daily. Adjust dose based on INR results as directed. (Patient taking differently: Take 2 mg by mouth daily 1mg on Mondays, Weds, Fridays. The there days 2mg.) 90 tablet 3     levothyroxine (SYNTHROID, LEVOTHROID) 150 MCG  "tablet [LEVOTHYROXINE (SYNTHROID, LEVOTHROID) 150 MCG TABLET] Take 150 mcg by mouth daily. (Patient not taking: Reported on 5/17/2022)        Allergies   Allergen Reactions     Align      Other reaction(s): Dizzy     Aspirin-Acetaminophen-Caffeine Unknown     ulcer     Cardizem [Diltiazem] Unknown     Stomach upset     Cephalexin      Other reaction(s): gas     Gabapentin Unknown     Hydrocodone-Acetaminophen Unknown     stomach upset     Nitrofurantoin      Other reaction(s): gas     Sulfa (Sulfonamide Antibiotics) [Sulfa Antibiotics] Unknown     Trees Unknown         Lab Results    Chemistry/lipid CBC Cardiac Enzymes/BNP/TSH/INR   Recent Labs   Lab Test 08/01/24  0958 04/27/22  1107 11/17/21  1025   TRIG  --   --  171*   LDL  --   --  53   BUN 16.2   < > 19      < > 144   CO2 29   < > 29    < > = values in this interval not displayed.    No results for input(s): \"WBC\", \"HGB\", \"HCT\", \"MCV\", \"PLT\" in the last 52292 hours. Recent Labs   Lab Test 08/01/24  0958   TSH 0.76        A total of 45 minutes was spent reviewing patient's medical records, obtaining history and performing examination, as well as discussing diagnoses/ recommendations with patient and answering all questions.                        Thank you for allowing me to participate in the care of your patient.      Sincerely,     Renetta Sosa MD     [facility]  cc:   Renetta Sosa MD  [address], [address]  [place] [address]      "

## 2024-08-06 NOTE — PATIENT INSTRUCTIONS
Continue current medications  Set up echocardiogram to follow up on your aortic valve  Consider stress test prior to consideration of hip replacement surgery  Talk to PCP to see if she thinks you would be a candidate for Gabapentin to help with neuro pain

## 2024-11-08 ENCOUNTER — ANCILLARY PROCEDURE (OUTPATIENT)
Dept: CARDIOLOGY | Facility: CLINIC | Age: 89
End: 2024-11-08
Attending: INTERNAL MEDICINE
Payer: COMMERCIAL

## 2024-11-08 DIAGNOSIS — Z95.0 CARDIAC PACEMAKER IN SITU: ICD-10-CM

## 2024-11-08 DIAGNOSIS — I49.5 SICK SINUS SYNDROME (H): ICD-10-CM

## 2024-11-08 LAB
MDC_IDC_EPISODE_DTM: NORMAL
MDC_IDC_EPISODE_ID: NORMAL
MDC_IDC_EPISODE_TYPE: NORMAL
MDC_IDC_LEAD_CONNECTION_STATUS: NORMAL
MDC_IDC_LEAD_IMPLANT_DT: NORMAL
MDC_IDC_LEAD_LOCATION: NORMAL
MDC_IDC_LEAD_LOCATION_DETAIL_1: NORMAL
MDC_IDC_LEAD_MFG: NORMAL
MDC_IDC_LEAD_MODEL: NORMAL
MDC_IDC_LEAD_POLARITY_TYPE: NORMAL
MDC_IDC_LEAD_SERIAL: NORMAL
MDC_IDC_LEAD_SPECIAL_FUNCTION: NORMAL
MDC_IDC_MSMT_BATTERY_DTM: NORMAL
MDC_IDC_MSMT_BATTERY_REMAINING_LONGEVITY: 30 MO
MDC_IDC_MSMT_BATTERY_REMAINING_PERCENTAGE: 38 %
MDC_IDC_MSMT_BATTERY_STATUS: NORMAL
MDC_IDC_MSMT_LEADCHNL_RV_IMPEDANCE_VALUE: 458 OHM
MDC_IDC_MSMT_LEADCHNL_RV_PACING_THRESHOLD_AMPLITUDE: 0.7 V
MDC_IDC_MSMT_LEADCHNL_RV_PACING_THRESHOLD_PULSEWIDTH: 0.4 MS
MDC_IDC_PG_IMPLANT_DTM: NORMAL
MDC_IDC_PG_MFG: NORMAL
MDC_IDC_PG_MODEL: NORMAL
MDC_IDC_PG_SERIAL: NORMAL
MDC_IDC_PG_TYPE: NORMAL
MDC_IDC_SESS_CLINIC_NAME: NORMAL
MDC_IDC_SESS_DTM: NORMAL
MDC_IDC_SESS_TYPE: NORMAL
MDC_IDC_SET_BRADY_LOWRATE: 60 {BEATS}/MIN
MDC_IDC_SET_BRADY_MAX_SENSOR_RATE: 120 {BEATS}/MIN
MDC_IDC_SET_BRADY_MODE: NORMAL
MDC_IDC_SET_LEADCHNL_RV_PACING_AMPLITUDE: 1.5 V
MDC_IDC_SET_LEADCHNL_RV_PACING_CAPTURE_MODE: NORMAL
MDC_IDC_SET_LEADCHNL_RV_PACING_POLARITY: NORMAL
MDC_IDC_SET_LEADCHNL_RV_PACING_PULSEWIDTH: 0.4 MS
MDC_IDC_SET_LEADCHNL_RV_SENSING_ADAPTATION_MODE: NORMAL
MDC_IDC_SET_LEADCHNL_RV_SENSING_POLARITY: NORMAL
MDC_IDC_SET_LEADCHNL_RV_SENSING_SENSITIVITY: 2.5 MV
MDC_IDC_SET_ZONE_DETECTION_INTERVAL: 375 MS
MDC_IDC_SET_ZONE_STATUS: NORMAL
MDC_IDC_SET_ZONE_TYPE: NORMAL
MDC_IDC_SET_ZONE_VENDOR_TYPE: NORMAL
MDC_IDC_STAT_BRADY_DTM_END: NORMAL
MDC_IDC_STAT_BRADY_DTM_START: NORMAL
MDC_IDC_STAT_BRADY_RV_PERCENT_PACED: 92 %
MDC_IDC_STAT_EPISODE_RECENT_COUNT: 0
MDC_IDC_STAT_EPISODE_RECENT_COUNT: 0
MDC_IDC_STAT_EPISODE_RECENT_COUNT_DTM_END: NORMAL
MDC_IDC_STAT_EPISODE_RECENT_COUNT_DTM_END: NORMAL
MDC_IDC_STAT_EPISODE_RECENT_COUNT_DTM_START: NORMAL
MDC_IDC_STAT_EPISODE_RECENT_COUNT_DTM_START: NORMAL
MDC_IDC_STAT_EPISODE_TYPE: NORMAL
MDC_IDC_STAT_EPISODE_TYPE: NORMAL
MDC_IDC_STAT_EPISODE_VENDOR_TYPE: NORMAL
MDC_IDC_STAT_EPISODE_VENDOR_TYPE: NORMAL

## 2024-11-08 PROCEDURE — 93294 REM INTERROG EVL PM/LDLS PM: CPT | Performed by: INTERNAL MEDICINE

## 2024-11-08 PROCEDURE — 93296 REM INTERROG EVL PM/IDS: CPT | Performed by: INTERNAL MEDICINE

## 2025-02-24 ENCOUNTER — ANCILLARY PROCEDURE (OUTPATIENT)
Dept: CARDIOLOGY | Facility: CLINIC | Age: OVER 89
End: 2025-02-24
Attending: INTERNAL MEDICINE
Payer: COMMERCIAL

## 2025-02-24 DIAGNOSIS — I49.5 SICK SINUS SYNDROME (H): ICD-10-CM

## 2025-02-24 DIAGNOSIS — Z95.0 CARDIAC PACEMAKER IN SITU: ICD-10-CM

## 2025-02-24 LAB
MDC_IDC_EPISODE_DTM: NORMAL
MDC_IDC_EPISODE_DTM: NORMAL
MDC_IDC_EPISODE_ID: NORMAL
MDC_IDC_EPISODE_ID: NORMAL
MDC_IDC_EPISODE_TYPE: NORMAL
MDC_IDC_EPISODE_TYPE: NORMAL
MDC_IDC_LEAD_CONNECTION_STATUS: NORMAL
MDC_IDC_LEAD_IMPLANT_DT: NORMAL
MDC_IDC_LEAD_LOCATION: NORMAL
MDC_IDC_LEAD_LOCATION_DETAIL_1: NORMAL
MDC_IDC_LEAD_MFG: NORMAL
MDC_IDC_LEAD_MODEL: NORMAL
MDC_IDC_LEAD_POLARITY_TYPE: NORMAL
MDC_IDC_LEAD_SERIAL: NORMAL
MDC_IDC_LEAD_SPECIAL_FUNCTION: NORMAL
MDC_IDC_MSMT_BATTERY_DTM: NORMAL
MDC_IDC_MSMT_BATTERY_REMAINING_LONGEVITY: 24 MO
MDC_IDC_MSMT_BATTERY_REMAINING_PERCENTAGE: 33 %
MDC_IDC_MSMT_BATTERY_STATUS: NORMAL
MDC_IDC_MSMT_LEADCHNL_RV_IMPEDANCE_VALUE: 467 OHM
MDC_IDC_MSMT_LEADCHNL_RV_PACING_THRESHOLD_AMPLITUDE: 0.6 V
MDC_IDC_MSMT_LEADCHNL_RV_PACING_THRESHOLD_PULSEWIDTH: 0.4 MS
MDC_IDC_PG_IMPLANT_DTM: NORMAL
MDC_IDC_PG_MFG: NORMAL
MDC_IDC_PG_MODEL: NORMAL
MDC_IDC_PG_SERIAL: NORMAL
MDC_IDC_PG_TYPE: NORMAL
MDC_IDC_SESS_CLINIC_NAME: NORMAL
MDC_IDC_SESS_DTM: NORMAL
MDC_IDC_SESS_TYPE: NORMAL
MDC_IDC_SET_BRADY_LOWRATE: 60 {BEATS}/MIN
MDC_IDC_SET_BRADY_MAX_SENSOR_RATE: 120 {BEATS}/MIN
MDC_IDC_SET_BRADY_MODE: NORMAL
MDC_IDC_SET_LEADCHNL_RV_PACING_AMPLITUDE: 1.5 V
MDC_IDC_SET_LEADCHNL_RV_PACING_CAPTURE_MODE: NORMAL
MDC_IDC_SET_LEADCHNL_RV_PACING_POLARITY: NORMAL
MDC_IDC_SET_LEADCHNL_RV_PACING_PULSEWIDTH: 0.4 MS
MDC_IDC_SET_LEADCHNL_RV_SENSING_ADAPTATION_MODE: NORMAL
MDC_IDC_SET_LEADCHNL_RV_SENSING_POLARITY: NORMAL
MDC_IDC_SET_LEADCHNL_RV_SENSING_SENSITIVITY: 2.5 MV
MDC_IDC_SET_ZONE_DETECTION_INTERVAL: 375 MS
MDC_IDC_SET_ZONE_STATUS: NORMAL
MDC_IDC_SET_ZONE_TYPE: NORMAL
MDC_IDC_SET_ZONE_VENDOR_TYPE: NORMAL
MDC_IDC_STAT_BRADY_DTM_END: NORMAL
MDC_IDC_STAT_BRADY_DTM_START: NORMAL
MDC_IDC_STAT_BRADY_RV_PERCENT_PACED: 94 %
MDC_IDC_STAT_EPISODE_RECENT_COUNT: 0
MDC_IDC_STAT_EPISODE_RECENT_COUNT: 0
MDC_IDC_STAT_EPISODE_RECENT_COUNT_DTM_END: NORMAL
MDC_IDC_STAT_EPISODE_RECENT_COUNT_DTM_END: NORMAL
MDC_IDC_STAT_EPISODE_RECENT_COUNT_DTM_START: NORMAL
MDC_IDC_STAT_EPISODE_RECENT_COUNT_DTM_START: NORMAL
MDC_IDC_STAT_EPISODE_TYPE: NORMAL
MDC_IDC_STAT_EPISODE_TYPE: NORMAL
MDC_IDC_STAT_EPISODE_VENDOR_TYPE: NORMAL
MDC_IDC_STAT_EPISODE_VENDOR_TYPE: NORMAL

## 2025-02-24 PROCEDURE — 93296 REM INTERROG EVL PM/IDS: CPT | Performed by: INTERNAL MEDICINE

## 2025-02-24 PROCEDURE — 93294 REM INTERROG EVL PM/LDLS PM: CPT | Performed by: INTERNAL MEDICINE

## 2025-06-04 ENCOUNTER — ANCILLARY PROCEDURE (OUTPATIENT)
Dept: CARDIOLOGY | Facility: CLINIC | Age: OVER 89
End: 2025-06-04
Attending: INTERNAL MEDICINE
Payer: COMMERCIAL

## 2025-06-04 DIAGNOSIS — I49.5 SICK SINUS SYNDROME (H): ICD-10-CM

## 2025-06-04 DIAGNOSIS — Z95.0 CARDIAC PACEMAKER IN SITU: ICD-10-CM

## 2025-06-04 LAB
MDC_IDC_EPISODE_DTM: NORMAL
MDC_IDC_EPISODE_DTM: NORMAL
MDC_IDC_EPISODE_ID: NORMAL
MDC_IDC_EPISODE_ID: NORMAL
MDC_IDC_EPISODE_TYPE: NORMAL
MDC_IDC_EPISODE_TYPE: NORMAL
MDC_IDC_LEAD_CONNECTION_STATUS: NORMAL
MDC_IDC_LEAD_IMPLANT_DT: NORMAL
MDC_IDC_LEAD_LOCATION: NORMAL
MDC_IDC_LEAD_LOCATION_DETAIL_1: NORMAL
MDC_IDC_LEAD_MFG: NORMAL
MDC_IDC_LEAD_MODEL: NORMAL
MDC_IDC_LEAD_POLARITY_TYPE: NORMAL
MDC_IDC_LEAD_SERIAL: NORMAL
MDC_IDC_LEAD_SPECIAL_FUNCTION: NORMAL
MDC_IDC_MSMT_BATTERY_DTM: NORMAL
MDC_IDC_MSMT_BATTERY_REMAINING_LONGEVITY: 18 MO
MDC_IDC_MSMT_BATTERY_REMAINING_PERCENTAGE: 24 %
MDC_IDC_MSMT_BATTERY_STATUS: NORMAL
MDC_IDC_MSMT_LEADCHNL_RV_IMPEDANCE_VALUE: 451 OHM
MDC_IDC_MSMT_LEADCHNL_RV_PACING_THRESHOLD_AMPLITUDE: 0.7 V
MDC_IDC_MSMT_LEADCHNL_RV_PACING_THRESHOLD_PULSEWIDTH: 0.4 MS
MDC_IDC_PG_IMPLANT_DTM: NORMAL
MDC_IDC_PG_MFG: NORMAL
MDC_IDC_PG_MODEL: NORMAL
MDC_IDC_PG_SERIAL: NORMAL
MDC_IDC_PG_TYPE: NORMAL
MDC_IDC_SESS_CLINIC_NAME: NORMAL
MDC_IDC_SESS_DTM: NORMAL
MDC_IDC_SESS_TYPE: NORMAL
MDC_IDC_SET_BRADY_LOWRATE: 60 {BEATS}/MIN
MDC_IDC_SET_BRADY_MAX_SENSOR_RATE: 120 {BEATS}/MIN
MDC_IDC_SET_BRADY_MODE: NORMAL
MDC_IDC_SET_LEADCHNL_RV_PACING_AMPLITUDE: 1.5 V
MDC_IDC_SET_LEADCHNL_RV_PACING_CAPTURE_MODE: NORMAL
MDC_IDC_SET_LEADCHNL_RV_PACING_POLARITY: NORMAL
MDC_IDC_SET_LEADCHNL_RV_PACING_PULSEWIDTH: 0.4 MS
MDC_IDC_SET_LEADCHNL_RV_SENSING_ADAPTATION_MODE: NORMAL
MDC_IDC_SET_LEADCHNL_RV_SENSING_POLARITY: NORMAL
MDC_IDC_SET_LEADCHNL_RV_SENSING_SENSITIVITY: 2.5 MV
MDC_IDC_SET_ZONE_DETECTION_INTERVAL: 375 MS
MDC_IDC_SET_ZONE_STATUS: NORMAL
MDC_IDC_SET_ZONE_TYPE: NORMAL
MDC_IDC_SET_ZONE_VENDOR_TYPE: NORMAL
MDC_IDC_STAT_BRADY_DTM_END: NORMAL
MDC_IDC_STAT_BRADY_DTM_START: NORMAL
MDC_IDC_STAT_BRADY_RV_PERCENT_PACED: 95 %
MDC_IDC_STAT_EPISODE_RECENT_COUNT: 0
MDC_IDC_STAT_EPISODE_RECENT_COUNT: 0
MDC_IDC_STAT_EPISODE_RECENT_COUNT_DTM_END: NORMAL
MDC_IDC_STAT_EPISODE_RECENT_COUNT_DTM_END: NORMAL
MDC_IDC_STAT_EPISODE_RECENT_COUNT_DTM_START: NORMAL
MDC_IDC_STAT_EPISODE_RECENT_COUNT_DTM_START: NORMAL
MDC_IDC_STAT_EPISODE_TYPE: NORMAL
MDC_IDC_STAT_EPISODE_TYPE: NORMAL
MDC_IDC_STAT_EPISODE_VENDOR_TYPE: NORMAL
MDC_IDC_STAT_EPISODE_VENDOR_TYPE: NORMAL

## 2025-06-04 PROCEDURE — 93296 REM INTERROG EVL PM/IDS: CPT | Performed by: INTERNAL MEDICINE

## 2025-06-04 PROCEDURE — 93294 REM INTERROG EVL PM/LDLS PM: CPT | Performed by: INTERNAL MEDICINE

## 2025-08-13 ENCOUNTER — LAB REQUISITION (OUTPATIENT)
Dept: LAB | Facility: CLINIC | Age: OVER 89
End: 2025-08-13
Payer: COMMERCIAL

## 2025-08-14 ENCOUNTER — LAB REQUISITION (OUTPATIENT)
Dept: LAB | Facility: CLINIC | Age: OVER 89
End: 2025-08-14
Payer: COMMERCIAL

## 2025-08-14 DIAGNOSIS — I48.91 UNSPECIFIED ATRIAL FIBRILLATION (H): ICD-10-CM

## 2025-08-14 DIAGNOSIS — N18.32 CHRONIC KIDNEY DISEASE, STAGE 3B (H): ICD-10-CM

## 2025-08-15 ENCOUNTER — ANCILLARY PROCEDURE (OUTPATIENT)
Dept: CARDIOLOGY | Facility: CLINIC | Age: OVER 89
End: 2025-08-15
Attending: INTERNAL MEDICINE
Payer: COMMERCIAL

## 2025-08-15 DIAGNOSIS — Z95.0 CARDIAC PACEMAKER IN SITU: ICD-10-CM

## 2025-08-15 DIAGNOSIS — I49.5 SICK SINUS SYNDROME (H): ICD-10-CM

## 2025-08-15 LAB
MDC_IDC_EPISODE_DTM: NORMAL
MDC_IDC_EPISODE_ID: NORMAL
MDC_IDC_EPISODE_TYPE: NORMAL
MDC_IDC_LEAD_CONNECTION_STATUS: NORMAL
MDC_IDC_LEAD_IMPLANT_DT: NORMAL
MDC_IDC_LEAD_LOCATION: NORMAL
MDC_IDC_LEAD_LOCATION_DETAIL_1: NORMAL
MDC_IDC_LEAD_MFG: NORMAL
MDC_IDC_LEAD_MODEL: NORMAL
MDC_IDC_LEAD_POLARITY_TYPE: NORMAL
MDC_IDC_LEAD_SERIAL: NORMAL
MDC_IDC_LEAD_SPECIAL_FUNCTION: NORMAL
MDC_IDC_MSMT_BATTERY_DTM: NORMAL
MDC_IDC_MSMT_BATTERY_REMAINING_LONGEVITY: 18 MO
MDC_IDC_MSMT_BATTERY_REMAINING_PERCENTAGE: 25 %
MDC_IDC_MSMT_BATTERY_STATUS: NORMAL
MDC_IDC_MSMT_LEADCHNL_RV_IMPEDANCE_VALUE: 453 OHM
MDC_IDC_MSMT_LEADCHNL_RV_PACING_THRESHOLD_AMPLITUDE: 0.6 V
MDC_IDC_MSMT_LEADCHNL_RV_PACING_THRESHOLD_PULSEWIDTH: 0.4 MS
MDC_IDC_MSMT_LEADCHNL_RV_SENSING_INTR_AMPL: 11.7 MV
MDC_IDC_PG_IMPLANT_DTM: NORMAL
MDC_IDC_PG_MFG: NORMAL
MDC_IDC_PG_MODEL: NORMAL
MDC_IDC_PG_SERIAL: NORMAL
MDC_IDC_PG_TYPE: NORMAL
MDC_IDC_SESS_CLINIC_NAME: NORMAL
MDC_IDC_SESS_DTM: NORMAL
MDC_IDC_SESS_TYPE: NORMAL
MDC_IDC_SET_BRADY_LOWRATE: 60 {BEATS}/MIN
MDC_IDC_SET_BRADY_MAX_SENSOR_RATE: 120 {BEATS}/MIN
MDC_IDC_SET_BRADY_MODE: NORMAL
MDC_IDC_SET_LEADCHNL_RV_PACING_AMPLITUDE: 1.5 V
MDC_IDC_SET_LEADCHNL_RV_PACING_CAPTURE_MODE: NORMAL
MDC_IDC_SET_LEADCHNL_RV_PACING_POLARITY: NORMAL
MDC_IDC_SET_LEADCHNL_RV_PACING_PULSEWIDTH: 0.4 MS
MDC_IDC_SET_LEADCHNL_RV_SENSING_ADAPTATION_MODE: NORMAL
MDC_IDC_SET_LEADCHNL_RV_SENSING_POLARITY: NORMAL
MDC_IDC_SET_LEADCHNL_RV_SENSING_SENSITIVITY: 2.5 MV
MDC_IDC_SET_ZONE_DETECTION_INTERVAL: 375 MS
MDC_IDC_SET_ZONE_STATUS: NORMAL
MDC_IDC_SET_ZONE_TYPE: NORMAL
MDC_IDC_SET_ZONE_VENDOR_TYPE: NORMAL
MDC_IDC_STAT_BRADY_DTM_END: NORMAL
MDC_IDC_STAT_BRADY_DTM_START: NORMAL
MDC_IDC_STAT_BRADY_RV_PERCENT_PACED: 96 %
MDC_IDC_STAT_EPISODE_RECENT_COUNT: 0
MDC_IDC_STAT_EPISODE_RECENT_COUNT: 0
MDC_IDC_STAT_EPISODE_RECENT_COUNT_DTM_END: NORMAL
MDC_IDC_STAT_EPISODE_RECENT_COUNT_DTM_END: NORMAL
MDC_IDC_STAT_EPISODE_RECENT_COUNT_DTM_START: NORMAL
MDC_IDC_STAT_EPISODE_RECENT_COUNT_DTM_START: NORMAL
MDC_IDC_STAT_EPISODE_TYPE: NORMAL
MDC_IDC_STAT_EPISODE_TYPE: NORMAL
MDC_IDC_STAT_EPISODE_VENDOR_TYPE: NORMAL
MDC_IDC_STAT_EPISODE_VENDOR_TYPE: NORMAL

## 2025-08-15 PROCEDURE — 93279 PRGRMG DEV EVAL PM/LDLS PM: CPT | Performed by: INTERNAL MEDICINE

## 2025-08-21 LAB
ANION GAP SERPL CALCULATED.3IONS-SCNC: 15 MMOL/L (ref 7–15)
BUN SERPL-MCNC: 34 MG/DL (ref 8–23)
CALCIUM SERPL-MCNC: 10.6 MG/DL (ref 8.8–10.4)
CHLORIDE SERPL-SCNC: 95 MMOL/L (ref 98–107)
CREAT SERPL-MCNC: 1.71 MG/DL (ref 0.51–0.95)
EGFRCR SERPLBLD CKD-EPI 2021: 27 ML/MIN/1.73M2
ERYTHROCYTE [DISTWIDTH] IN BLOOD BY AUTOMATED COUNT: 14.6 % (ref 10–15)
GLUCOSE SERPL-MCNC: 163 MG/DL (ref 70–99)
HCO3 SERPL-SCNC: 30 MMOL/L (ref 22–29)
HCT VFR BLD AUTO: 44.1 % (ref 35–47)
HGB BLD-MCNC: 14.1 G/DL (ref 11.7–15.7)
MCH RBC QN AUTO: 33.5 PG (ref 26.5–33)
MCHC RBC AUTO-ENTMCNC: 32 G/DL (ref 31.5–36.5)
MCV RBC AUTO: 104.8 FL (ref 78–100)
PLATELET # BLD AUTO: 218 10E3/UL (ref 150–450)
POTASSIUM SERPL-SCNC: 4.3 MMOL/L (ref 3.4–5.3)
RBC # BLD AUTO: 4.21 10E6/UL (ref 3.8–5.2)
SODIUM SERPL-SCNC: 140 MMOL/L (ref 135–145)
WBC # BLD AUTO: 6.75 10E3/UL (ref 4–11)

## 2025-08-26 ENCOUNTER — LAB REQUISITION (OUTPATIENT)
Dept: LAB | Facility: CLINIC | Age: OVER 89
End: 2025-08-26
Payer: COMMERCIAL

## 2025-08-26 DIAGNOSIS — I48.91 UNSPECIFIED ATRIAL FIBRILLATION (H): ICD-10-CM

## 2025-08-26 DIAGNOSIS — I73.9 PERIPHERAL VASCULAR DISEASE, UNSPECIFIED: ICD-10-CM

## 2025-08-26 DIAGNOSIS — N18.32 CHRONIC KIDNEY DISEASE, STAGE 3B (H): ICD-10-CM

## 2025-08-28 LAB
ANION GAP SERPL CALCULATED.3IONS-SCNC: 11 MMOL/L (ref 7–15)
BUN SERPL-MCNC: 27.3 MG/DL (ref 8–23)
CALCIUM SERPL-MCNC: 10 MG/DL (ref 8.8–10.4)
CHLORIDE SERPL-SCNC: 101 MMOL/L (ref 98–107)
CREAT SERPL-MCNC: 1.6 MG/DL (ref 0.51–0.95)
EGFRCR SERPLBLD CKD-EPI 2021: 30 ML/MIN/1.73M2
ERYTHROCYTE [DISTWIDTH] IN BLOOD BY AUTOMATED COUNT: 14.6 % (ref 10–15)
GLUCOSE SERPL-MCNC: 84 MG/DL (ref 70–99)
HCO3 SERPL-SCNC: 29 MMOL/L (ref 22–29)
HCT VFR BLD AUTO: 39.6 % (ref 35–47)
HGB BLD-MCNC: 12.7 G/DL (ref 11.7–15.7)
MCH RBC QN AUTO: 33.3 PG (ref 26.5–33)
MCHC RBC AUTO-ENTMCNC: 32.1 G/DL (ref 31.5–36.5)
MCV RBC AUTO: 103.9 FL (ref 78–100)
PLATELET # BLD AUTO: 212 10E3/UL (ref 150–450)
POTASSIUM SERPL-SCNC: 4.7 MMOL/L (ref 3.4–5.3)
RBC # BLD AUTO: 3.81 10E6/UL (ref 3.8–5.2)
SODIUM SERPL-SCNC: 141 MMOL/L (ref 135–145)
WBC # BLD AUTO: 7.5 10E3/UL (ref 4–11)

## 2025-09-02 ENCOUNTER — HOSPITAL ENCOUNTER (OUTPATIENT)
Dept: CARDIOLOGY | Facility: CLINIC | Age: OVER 89
Discharge: HOME | End: 2025-09-02
Attending: INTERNAL MEDICINE
Payer: COMMERCIAL

## 2025-09-02 DIAGNOSIS — I25.10 CORONARY ARTERY DISEASE INVOLVING NATIVE CORONARY ARTERY OF NATIVE HEART WITHOUT ANGINA PECTORIS: ICD-10-CM

## 2025-09-02 DIAGNOSIS — Z95.2 S/P TAVR (TRANSCATHETER AORTIC VALVE REPLACEMENT): ICD-10-CM

## 2025-09-02 DIAGNOSIS — I48.21 PERMANENT ATRIAL FIBRILLATION (H): ICD-10-CM

## 2025-09-02 LAB — LVEF ECHO: NORMAL

## 2025-09-02 PROCEDURE — 93306 TTE W/DOPPLER COMPLETE: CPT | Mod: 26 | Performed by: INTERNAL MEDICINE

## 2025-09-02 PROCEDURE — 93306 TTE W/DOPPLER COMPLETE: CPT
